# Patient Record
Sex: FEMALE | Race: BLACK OR AFRICAN AMERICAN | NOT HISPANIC OR LATINO | URBAN - METROPOLITAN AREA
[De-identification: names, ages, dates, MRNs, and addresses within clinical notes are randomized per-mention and may not be internally consistent; named-entity substitution may affect disease eponyms.]

---

## 2021-02-21 ENCOUNTER — EMERGENCY (EMERGENCY)
Facility: HOSPITAL | Age: 26
LOS: 1 days | Discharge: PSYCHIATRIC OR SUBSTANCE USE FACILITY | End: 2021-02-23
Attending: EMERGENCY MEDICINE
Payer: COMMERCIAL

## 2021-02-21 VITALS
OXYGEN SATURATION: 100 % | HEART RATE: 63 BPM | DIASTOLIC BLOOD PRESSURE: 87 MMHG | RESPIRATION RATE: 18 BRPM | SYSTOLIC BLOOD PRESSURE: 125 MMHG | TEMPERATURE: 98 F | WEIGHT: 125 LBS | HEIGHT: 68 IN

## 2021-02-21 DIAGNOSIS — Z20.822 CONTACT WITH AND (SUSPECTED) EXPOSURE TO COVID-19: ICD-10-CM

## 2021-02-21 DIAGNOSIS — R45.851 SUICIDAL IDEATIONS: ICD-10-CM

## 2021-02-21 DIAGNOSIS — F29 UNSPECIFIED PSYCHOSIS NOT DUE TO A SUBSTANCE OR KNOWN PHYSIOLOGICAL CONDITION: ICD-10-CM

## 2021-02-21 LAB
ALBUMIN SERPL ELPH-MCNC: 4.4 G/DL — SIGNIFICANT CHANGE UP (ref 3.3–5)
ALP SERPL-CCNC: 44 U/L — SIGNIFICANT CHANGE UP (ref 40–120)
ALT FLD-CCNC: 24 U/L — SIGNIFICANT CHANGE UP (ref 12–78)
ANION GAP SERPL CALC-SCNC: 11 MMOL/L — SIGNIFICANT CHANGE UP (ref 5–17)
APAP SERPL-MCNC: < 2 UG/ML (ref 10–30)
AST SERPL-CCNC: 22 U/L — SIGNIFICANT CHANGE UP (ref 15–37)
BASOPHILS # BLD AUTO: 0.04 K/UL — SIGNIFICANT CHANGE UP (ref 0–0.2)
BASOPHILS NFR BLD AUTO: 0.4 % — SIGNIFICANT CHANGE UP (ref 0–2)
BILIRUB SERPL-MCNC: 0.6 MG/DL — SIGNIFICANT CHANGE UP (ref 0.2–1.2)
BUN SERPL-MCNC: 9 MG/DL — SIGNIFICANT CHANGE UP (ref 7–23)
CALCIUM SERPL-MCNC: 9 MG/DL — SIGNIFICANT CHANGE UP (ref 8.5–10.1)
CHLORIDE SERPL-SCNC: 104 MMOL/L — SIGNIFICANT CHANGE UP (ref 96–108)
CO2 SERPL-SCNC: 23 MMOL/L — SIGNIFICANT CHANGE UP (ref 22–31)
CREAT SERPL-MCNC: 0.82 MG/DL — SIGNIFICANT CHANGE UP (ref 0.5–1.3)
EOSINOPHIL # BLD AUTO: 0.02 K/UL — SIGNIFICANT CHANGE UP (ref 0–0.5)
EOSINOPHIL NFR BLD AUTO: 0.2 % — SIGNIFICANT CHANGE UP (ref 0–6)
ETHANOL SERPL-MCNC: <10 MG/DL — SIGNIFICANT CHANGE UP (ref 0–10)
GLUCOSE SERPL-MCNC: 73 MG/DL — SIGNIFICANT CHANGE UP (ref 70–99)
HCT VFR BLD CALC: 37.7 % — SIGNIFICANT CHANGE UP (ref 34.5–45)
HGB BLD-MCNC: 12.5 G/DL — SIGNIFICANT CHANGE UP (ref 11.5–15.5)
IMM GRANULOCYTES NFR BLD AUTO: 0.4 % — SIGNIFICANT CHANGE UP (ref 0–1.5)
LYMPHOCYTES # BLD AUTO: 2.36 K/UL — SIGNIFICANT CHANGE UP (ref 1–3.3)
LYMPHOCYTES # BLD AUTO: 23.8 % — SIGNIFICANT CHANGE UP (ref 13–44)
MCHC RBC-ENTMCNC: 26.9 PG — LOW (ref 27–34)
MCHC RBC-ENTMCNC: 33.2 GM/DL — SIGNIFICANT CHANGE UP (ref 32–36)
MCV RBC AUTO: 81.1 FL — SIGNIFICANT CHANGE UP (ref 80–100)
MONOCYTES # BLD AUTO: 0.8 K/UL — SIGNIFICANT CHANGE UP (ref 0–0.9)
MONOCYTES NFR BLD AUTO: 8.1 % — SIGNIFICANT CHANGE UP (ref 2–14)
NEUTROPHILS # BLD AUTO: 6.66 K/UL — SIGNIFICANT CHANGE UP (ref 1.8–7.4)
NEUTROPHILS NFR BLD AUTO: 67.1 % — SIGNIFICANT CHANGE UP (ref 43–77)
NRBC # BLD: 0 /100 WBCS — SIGNIFICANT CHANGE UP (ref 0–0)
PLATELET # BLD AUTO: 295 K/UL — SIGNIFICANT CHANGE UP (ref 150–400)
POTASSIUM SERPL-MCNC: 4.1 MMOL/L — SIGNIFICANT CHANGE UP (ref 3.5–5.3)
POTASSIUM SERPL-SCNC: 4.1 MMOL/L — SIGNIFICANT CHANGE UP (ref 3.5–5.3)
PROT SERPL-MCNC: 8 GM/DL — SIGNIFICANT CHANGE UP (ref 6–8.3)
RBC # BLD: 4.65 M/UL — SIGNIFICANT CHANGE UP (ref 3.8–5.2)
RBC # FLD: 13.7 % — SIGNIFICANT CHANGE UP (ref 10.3–14.5)
SALICYLATES SERPL-MCNC: <1.7 MG/DL — LOW (ref 2.8–20)
SODIUM SERPL-SCNC: 138 MMOL/L — SIGNIFICANT CHANGE UP (ref 135–145)
TSH SERPL-MCNC: 0.65 UU/ML — SIGNIFICANT CHANGE UP (ref 0.36–3.74)
WBC # BLD: 9.92 K/UL — SIGNIFICANT CHANGE UP (ref 3.8–10.5)
WBC # FLD AUTO: 9.92 K/UL — SIGNIFICANT CHANGE UP (ref 3.8–10.5)

## 2021-02-21 PROCEDURE — 93010 ELECTROCARDIOGRAM REPORT: CPT

## 2021-02-21 PROCEDURE — 90792 PSYCH DIAG EVAL W/MED SRVCS: CPT | Mod: 95

## 2021-02-21 PROCEDURE — 99285 EMERGENCY DEPT VISIT HI MDM: CPT
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
## 2021-02-21 NOTE — ED PROVIDER NOTE - FAMILY DETAILS FREE TEXT FOR MDM ADDL HISTORY OBTAINED FROM QUESTION
Mother and Father in waiting room.  States patient has been isolating herself much more and withdrawn.  The three of them just came from vacation in [place].  During the entire trip she continued to withdrawn and not wanting to get outside.  She admitted to her parents that she has thought of killing herself and expressed her many thoughts of wanting to kill them.  She tells her mother that she has had frequent thoughts of her dying because of her.  Mother states that she no longer feels safe.  While on the plane the patient seemed to wander towards the [facility] area searching for a way to get off the plane.  While on the airtran she kept trying to get away.  And she tried to jump in the  seat to drive off.  Family kept asking her where she was trying to get to but patient does not respond.  Family states that her eyes seem wide and she "seems off."

## 2021-02-21 NOTE — ED PROVIDER NOTE - CLINICAL SUMMARY MEDICAL DECISION MAKING FREE TEXT BOX
Patient with depression and suicidal and homicidal thoughts withdrawn behavior and flat affect.  Will check labs including TSH and have patient evaluated by psychiatry.  Patient signed out to overnight physician pending psychiatric evaluation.
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
## 2021-02-21 NOTE — ED PROVIDER NOTE - OBJECTIVE STATEMENT
This patient is a 25 year old woman who presents to the ER for psychiatric evaluation.  Patient reporting thoughts of self harm with plan to stab herself.  She denies hallucinations, alcohol and drug use.  Patient states that she has a lot of stress.

## 2021-02-21 NOTE — ED PROVIDER NOTE - SKIN, MLM
Skin normal color for race, warm, dry and intact. No evidence of rash.  Tan lines noted no abrasion or cuts.

## 2021-02-21 NOTE — ED PROVIDER NOTE - PROGRESS NOTE DETAILS
Mother - Esther Mendoza [phone]  Father - Yao Mendoza [phone] TelePsych called and made aware. Telepsych (Dr. Cortes) recommends patient be given Seroquel 25mg for sleep and she will be reassessed in the morning.

## 2021-02-21 NOTE — ED ADULT TRIAGE NOTE - CHIEF COMPLAINT QUOTE
pt endorses SI, with no plan. states she attempted to hurt herself with a pen earlier today. hx cutting her back. denies hallucinations. pt states "I get angry sometimes, but I don't want to hurt anyone." first time pt is speaking regarding mental health concerns. pt is withdrawn in triage. pt endorses SI, with no plan. states she attempted to hurt herself with a pen earlier today. hx cutting her back. denies hallucinations. pt states "I get angry sometimes, but I don't want to hurt anyone." first time pt is speaking regarding mental health concerns. pt is withdrawn in triage. 1:1 constant observation initiated in triage

## 2021-02-21 NOTE — ED ADULT NURSE NOTE - ED STAT RN HANDOFF DETAILS
Report endorsed to oncoming RN Yue. Safety checks completed this shift. Safety rounds completed hourly.  IV sites checked Q2+remains WDL. Medications administered as ordered with no signs/symptoms of adverse reactions. Fall & skin precautions in place. Any issues endorsed to oncoming RN for follow up. 1:1 mantained for SI.

## 2021-02-22 DIAGNOSIS — F32.9 MAJOR DEPRESSIVE DISORDER, SINGLE EPISODE, UNSPECIFIED: ICD-10-CM

## 2021-02-22 LAB
AMPHET UR-MCNC: NEGATIVE — SIGNIFICANT CHANGE UP
APPEARANCE UR: CLEAR — SIGNIFICANT CHANGE UP
BARBITURATES UR SCN-MCNC: NEGATIVE — SIGNIFICANT CHANGE UP
BENZODIAZ UR-MCNC: NEGATIVE — SIGNIFICANT CHANGE UP
BILIRUB UR-MCNC: NEGATIVE — SIGNIFICANT CHANGE UP
COCAINE METAB.OTHER UR-MCNC: NEGATIVE — SIGNIFICANT CHANGE UP
COLOR SPEC: YELLOW — SIGNIFICANT CHANGE UP
DIFF PNL FLD: NEGATIVE — SIGNIFICANT CHANGE UP
FLUAV AG NPH QL: SIGNIFICANT CHANGE UP
FLUBV AG NPH QL: SIGNIFICANT CHANGE UP
GLUCOSE UR QL: NEGATIVE MG/DL — SIGNIFICANT CHANGE UP
HCG UR QL: NEGATIVE — SIGNIFICANT CHANGE UP
KETONES UR-MCNC: ABNORMAL
LEUKOCYTE ESTERASE UR-ACNC: NEGATIVE — SIGNIFICANT CHANGE UP
METHADONE UR-MCNC: NEGATIVE — SIGNIFICANT CHANGE UP
NITRITE UR-MCNC: NEGATIVE — SIGNIFICANT CHANGE UP
OPIATES UR-MCNC: NEGATIVE — SIGNIFICANT CHANGE UP
PCP SPEC-MCNC: SIGNIFICANT CHANGE UP
PCP UR-MCNC: NEGATIVE — SIGNIFICANT CHANGE UP
PH UR: 6 — SIGNIFICANT CHANGE UP (ref 5–8)
PROT UR-MCNC: NEGATIVE MG/DL — SIGNIFICANT CHANGE UP
SARS-COV-2 RNA SPEC QL NAA+PROBE: SIGNIFICANT CHANGE UP
SP GR SPEC: 1.01 — SIGNIFICANT CHANGE UP (ref 1.01–1.02)
THC UR QL: NEGATIVE — SIGNIFICANT CHANGE UP
UROBILINOGEN FLD QL: NEGATIVE MG/DL — SIGNIFICANT CHANGE UP

## 2021-02-22 PROCEDURE — 70450 CT HEAD/BRAIN W/O DYE: CPT | Mod: 26,MA

## 2021-02-22 PROCEDURE — 90792 PSYCH DIAG EVAL W/MED SRVCS: CPT | Mod: 95

## 2021-02-22 RX ORDER — QUETIAPINE FUMARATE 200 MG/1
25 TABLET, FILM COATED ORAL ONCE
Refills: 0 | Status: COMPLETED | OUTPATIENT
Start: 2021-02-22 | End: 2021-02-22

## 2021-02-22 RX ORDER — RISPERIDONE 4 MG/1
0.5 TABLET ORAL EVERY 12 HOURS
Refills: 0 | Status: DISCONTINUED | OUTPATIENT
Start: 2021-02-22 | End: 2021-02-23

## 2021-02-22 NOTE — ED BEHAVIORAL HEALTH ASSESSMENT NOTE - DIFFERENTIAL
Impression:  include first break psychosis, MDD with psychotic features, substance induced psychotic disorder

## 2021-02-22 NOTE — ED ADULT NURSE REASSESSMENT NOTE - NS ED NURSE REASSESS COMMENT FT1
Patient alert and oriented, attempted to redraw green top, unsuccessful. MD Alfonso made aware. Phlebotomist  paged.
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
## 2021-02-22 NOTE — ED BEHAVIORAL HEALTH ASSESSMENT NOTE - HPI (INCLUDE ILLNESS QUALITY, SEVERITY, DURATION, TIMING, CONTEXT, MODIFYING FACTORS, ASSOCIATED SIGNS AND SYMPTOMS)
This is a 25 yo AA female, domiciled in [place], single, unemployed, no children, no prior psych history, no med history, who was BIB by parents for disorganized behavior, SI and HI.    Patient uncooperative, presents with thought blocking and refusing to speak.  States she “has a confession” and tells writer she is “sad”.  She however denies SI and HI with no plan or intent.  She reports she has not slept for “a while”.  She then refuses to engage in evaluation.

## 2021-02-22 NOTE — ED BEHAVIORAL HEALTH ASSESSMENT NOTE - OTHER PAST PSYCHIATRIC HISTORY (INCLUDE DETAILS REGARDING ONSET, COURSE OF ILLNESS, INPATIENT/OUTPATIENT TREATMENT)
PPH:    -Diagnosis:  denies   -Psych hospitalizations:  denies   -SA/HA:  denies   -Current meds: none   -Medication trials:  none   -Outpatient psychiatric care:   does not have one

## 2021-02-22 NOTE — ED BEHAVIORAL HEALTH NOTE - BEHAVIORAL HEALTH NOTE
26yo domiciled w/ family, unemployed, single AA F w/ no known past hx, w/ hx of cannabis use who presents w/ family for disorganized beh, with reported SI/HI expressed. Pt had been awaiting re-assessment pending utox and ct head. Both are neg.       Pt was seen and evaluated via telemonitor. Pt remained oddly related, internally preoccupied, thought blocked. Patient started to say that she "felt happier" and was "less stressed." When asked about why this was, pt started to fold her hands in prayer. She reported that she "needed to confess for all the bad things she's done."    Collateral from ED attending: reported that pt was offered po risperdal in am but refused; remains medically clear; reported no s/sx of encephalopathy, no acute medical issues.     Collateral from parents: Yao [phone] and Esther [phone]: updated about findings of CT head and plan for inpatient psych admission pending bed availability; asked to inform when pt is assisgned a bed      CT head: neg; Utox: neg; COVID: neg    MSE: appears stated age, oddly related. +speech latency. +thought blocking, +internally preoccupied; +delusions of guilt, some Judaism preoccupation. Insight and judgment: poor/poor. Impulse control: fair        Diagnosis: unspecified psychosis      Assessment and plan:  Pt w/ continued speech latency, with thought blocking, internal preoccupation with disorganized beh. She's now noted to be randomly praying and trying to confess. Pt's CT head and utox are neg. Pt will need psychiatric hospitalization as she is unable to care for herself and gravely disabled by her ongoing psychosis.    -start risperdal 0.5mg po BID  -continue 1:1 inc elopement precaution  -prn: haldol 2mg po/Im q6hr prn agitation  -telepsych to continue to follow  -psych dispo: 2PC involuntary psych admission pending bed availability  -ED provider made aware
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
## 2021-02-22 NOTE — ED BEHAVIORAL HEALTH ASSESSMENT NOTE - PSYCHIATRIC ISSUES AND PLAN (INCLUDE STANDING AND PRN MEDICATION)
Seroquel 25mg PO once for insomnia;  Haldol 5mg PO q6 hours prn for agitation, Ativan 2mg PO q6 hours prn for severe anxiety

## 2021-02-22 NOTE — ED BEHAVIORAL HEALTH ASSESSMENT NOTE - RISK ASSESSMENT
Risk factors include history of depression, family history of schizophrenia, poor adl’s, withdrawn, history of cannabis use.  Protective factors include supportive family, no acute medical problems, no prior history of suicidal attempts, no access to weapons Unable to determine Suicide Risk

## 2021-02-22 NOTE — ED BEHAVIORAL HEALTH ASSESSMENT NOTE - SUMMARY
This is a 25 yo AA female, domiciled in [place], single, unemployed, no children, no prior psych history, no med history, who was BIB by parents for disorganized behavior, SI and HI.    Patient is uncooperative.  She reports feeling sad but denies SI and HI with no plan or intent.  She states she has not slept for a while.  Parents however report she has thought of killing herself and expressed many thoughts of wanting to kill them. Parents reports she is disorganized, odd, poor ADL’s, not eating and she has history of cannabis use. There is no psych history.  Differentials include first break psychosis, MDD with psychotic features, substance induced psychotic disorder.   Will need to reassess patient after she has slept, utox is obtained and she is more cooperative.     COVID SCREENIN.  Have you had a COVID-19 test in the last 21 days?  Pt. would not answer  2.  In the past 10 days, have you been around anyone with a positive COVID-19 test?  Pt. would not answer  3.Have you been out of [place] within the past 10 days?  Pt. would not answer, but parents state they just went to [place]     Plan:  1.  Hold for reassess  2.  Medication: Seroquel 25mg PO once for insomnia;  Haldol 5mg PO q6 hours prn for agitation, Ativan 2mg PO q6 hours prn for severe anxiety  3.  Observation 1:1 close observation   4.  Follow up labs, EKG, urine tox

## 2021-02-22 NOTE — ED ADULT NURSE REASSESSMENT NOTE - NS ED NURSE REASSESS COMMENT FT1
PT ALERT AND AT BEDSIDE, REFUSING TO SIT DOWN, STANDING AT THIS DOOR SAT THIS TIME, PT WAS APPROACHED MEDS REFUSED, DESPITE EDUCATION AND ENCOURAGEMENT, MD ,MADE AWARE , WILL CONTINUE TO MONITOR.

## 2021-02-23 VITALS
OXYGEN SATURATION: 100 % | HEART RATE: 103 BPM | TEMPERATURE: 99 F | RESPIRATION RATE: 18 BRPM | SYSTOLIC BLOOD PRESSURE: 132 MMHG | DIASTOLIC BLOOD PRESSURE: 91 MMHG

## 2021-06-21 ENCOUNTER — EMERGENCY (EMERGENCY)
Facility: HOSPITAL | Age: 26
LOS: 0 days | Discharge: ANOTHER HOSPITAL | End: 2021-06-22
Attending: STUDENT IN AN ORGANIZED HEALTH CARE EDUCATION/TRAINING PROGRAM
Payer: MEDICAID

## 2021-06-21 VITALS
SYSTOLIC BLOOD PRESSURE: 110 MMHG | RESPIRATION RATE: 16 BRPM | DIASTOLIC BLOOD PRESSURE: 77 MMHG | TEMPERATURE: 98 F | OXYGEN SATURATION: 99 % | WEIGHT: 119.93 LBS | HEART RATE: 70 BPM | HEIGHT: 68 IN

## 2021-06-21 DIAGNOSIS — F20.9 SCHIZOPHRENIA, UNSPECIFIED: ICD-10-CM

## 2021-06-21 DIAGNOSIS — Z91.14 PATIENT'S OTHER NONCOMPLIANCE WITH MEDICATION REGIMEN: ICD-10-CM

## 2021-06-21 DIAGNOSIS — Z91.5 PERSONAL HISTORY OF SELF-HARM: ICD-10-CM

## 2021-06-21 DIAGNOSIS — Z20.822 CONTACT WITH AND (SUSPECTED) EXPOSURE TO COVID-19: ICD-10-CM

## 2021-06-21 DIAGNOSIS — R45.851 SUICIDAL IDEATIONS: ICD-10-CM

## 2021-06-21 LAB
ALBUMIN SERPL ELPH-MCNC: 3.9 G/DL — SIGNIFICANT CHANGE UP (ref 3.3–5)
ALP SERPL-CCNC: 45 U/L — SIGNIFICANT CHANGE UP (ref 40–120)
ALT FLD-CCNC: 21 U/L — SIGNIFICANT CHANGE UP (ref 12–78)
AMPHET UR-MCNC: NEGATIVE — SIGNIFICANT CHANGE UP
ANION GAP SERPL CALC-SCNC: 6 MMOL/L — SIGNIFICANT CHANGE UP (ref 5–17)
APAP SERPL-MCNC: < 2 UG/ML (ref 10–30)
APPEARANCE UR: ABNORMAL
AST SERPL-CCNC: 12 U/L — LOW (ref 15–37)
BACTERIA # UR AUTO: ABNORMAL
BARBITURATES UR SCN-MCNC: NEGATIVE — SIGNIFICANT CHANGE UP
BASOPHILS # BLD AUTO: 0.05 K/UL — SIGNIFICANT CHANGE UP (ref 0–0.2)
BASOPHILS NFR BLD AUTO: 0.7 % — SIGNIFICANT CHANGE UP (ref 0–2)
BENZODIAZ UR-MCNC: NEGATIVE — SIGNIFICANT CHANGE UP
BILIRUB SERPL-MCNC: 0.4 MG/DL — SIGNIFICANT CHANGE UP (ref 0.2–1.2)
BILIRUB UR-MCNC: NEGATIVE — SIGNIFICANT CHANGE UP
BUN SERPL-MCNC: 14 MG/DL — SIGNIFICANT CHANGE UP (ref 7–23)
CALCIUM SERPL-MCNC: 9.1 MG/DL — SIGNIFICANT CHANGE UP (ref 8.5–10.1)
CHLORIDE SERPL-SCNC: 103 MMOL/L — SIGNIFICANT CHANGE UP (ref 96–108)
CO2 SERPL-SCNC: 30 MMOL/L — SIGNIFICANT CHANGE UP (ref 22–31)
COCAINE METAB.OTHER UR-MCNC: NEGATIVE — SIGNIFICANT CHANGE UP
COLOR SPEC: YELLOW — SIGNIFICANT CHANGE UP
CREAT SERPL-MCNC: 0.97 MG/DL — SIGNIFICANT CHANGE UP (ref 0.5–1.3)
DIFF PNL FLD: ABNORMAL
EOSINOPHIL # BLD AUTO: 0.12 K/UL — SIGNIFICANT CHANGE UP (ref 0–0.5)
EOSINOPHIL NFR BLD AUTO: 1.7 % — SIGNIFICANT CHANGE UP (ref 0–6)
EPI CELLS # UR: SIGNIFICANT CHANGE UP
ETHANOL SERPL-MCNC: <10 MG/DL — SIGNIFICANT CHANGE UP (ref 0–10)
FLUAV AG NPH QL: SIGNIFICANT CHANGE UP
FLUBV AG NPH QL: SIGNIFICANT CHANGE UP
GLUCOSE SERPL-MCNC: 78 MG/DL — SIGNIFICANT CHANGE UP (ref 70–99)
GLUCOSE UR QL: NEGATIVE MG/DL — SIGNIFICANT CHANGE UP
HCG UR QL: NEGATIVE — SIGNIFICANT CHANGE UP
HCT VFR BLD CALC: 39.9 % — SIGNIFICANT CHANGE UP (ref 34.5–45)
HGB BLD-MCNC: 12.8 G/DL — SIGNIFICANT CHANGE UP (ref 11.5–15.5)
IMM GRANULOCYTES NFR BLD AUTO: 0.1 % — SIGNIFICANT CHANGE UP (ref 0–1.5)
KETONES UR-MCNC: NEGATIVE — SIGNIFICANT CHANGE UP
LEUKOCYTE ESTERASE UR-ACNC: ABNORMAL
LYMPHOCYTES # BLD AUTO: 3.23 K/UL — SIGNIFICANT CHANGE UP (ref 1–3.3)
LYMPHOCYTES # BLD AUTO: 46 % — HIGH (ref 13–44)
MCHC RBC-ENTMCNC: 27.4 PG — SIGNIFICANT CHANGE UP (ref 27–34)
MCHC RBC-ENTMCNC: 32.1 GM/DL — SIGNIFICANT CHANGE UP (ref 32–36)
MCV RBC AUTO: 85.3 FL — SIGNIFICANT CHANGE UP (ref 80–100)
METHADONE UR-MCNC: NEGATIVE — SIGNIFICANT CHANGE UP
MONOCYTES # BLD AUTO: 0.71 K/UL — SIGNIFICANT CHANGE UP (ref 0–0.9)
MONOCYTES NFR BLD AUTO: 10.1 % — SIGNIFICANT CHANGE UP (ref 2–14)
NEUTROPHILS # BLD AUTO: 2.9 K/UL — SIGNIFICANT CHANGE UP (ref 1.8–7.4)
NEUTROPHILS NFR BLD AUTO: 41.4 % — LOW (ref 43–77)
NITRITE UR-MCNC: NEGATIVE — SIGNIFICANT CHANGE UP
NRBC # BLD: 0 /100 WBCS — SIGNIFICANT CHANGE UP (ref 0–0)
OPIATES UR-MCNC: NEGATIVE — SIGNIFICANT CHANGE UP
PCP SPEC-MCNC: SIGNIFICANT CHANGE UP
PCP UR-MCNC: NEGATIVE — SIGNIFICANT CHANGE UP
PH UR: 6.5 — SIGNIFICANT CHANGE UP (ref 5–8)
PLATELET # BLD AUTO: 257 K/UL — SIGNIFICANT CHANGE UP (ref 150–400)
POTASSIUM SERPL-MCNC: 3.3 MMOL/L — LOW (ref 3.5–5.3)
POTASSIUM SERPL-SCNC: 3.3 MMOL/L — LOW (ref 3.5–5.3)
PROT SERPL-MCNC: 7.6 GM/DL — SIGNIFICANT CHANGE UP (ref 6–8.3)
PROT UR-MCNC: 15 MG/DL
RBC # BLD: 4.68 M/UL — SIGNIFICANT CHANGE UP (ref 3.8–5.2)
RBC # FLD: 13.7 % — SIGNIFICANT CHANGE UP (ref 10.3–14.5)
RBC CASTS # UR COMP ASSIST: SIGNIFICANT CHANGE UP /HPF (ref 0–4)
SALICYLATES SERPL-MCNC: <1.7 MG/DL — LOW (ref 2.8–20)
SARS-COV-2 RNA SPEC QL NAA+PROBE: SIGNIFICANT CHANGE UP
SODIUM SERPL-SCNC: 139 MMOL/L — SIGNIFICANT CHANGE UP (ref 135–145)
SP GR SPEC: 1.01 — SIGNIFICANT CHANGE UP (ref 1.01–1.02)
THC UR QL: NEGATIVE — SIGNIFICANT CHANGE UP
TSH SERPL-MCNC: 2.3 UIU/ML — SIGNIFICANT CHANGE UP (ref 0.36–3.74)
UROBILINOGEN FLD QL: NEGATIVE MG/DL — SIGNIFICANT CHANGE UP
WBC # BLD: 7.02 K/UL — SIGNIFICANT CHANGE UP (ref 3.8–10.5)
WBC # FLD AUTO: 7.02 K/UL — SIGNIFICANT CHANGE UP (ref 3.8–10.5)
WBC UR QL: ABNORMAL

## 2021-06-21 PROCEDURE — 93010 ELECTROCARDIOGRAM REPORT: CPT

## 2021-06-21 PROCEDURE — ZZZZZ: CPT

## 2021-06-21 PROCEDURE — 99284 EMERGENCY DEPT VISIT MOD MDM: CPT

## 2021-06-21 NOTE — ED BEHAVIORAL HEALTH NOTE - BEHAVIORAL HEALTH NOTE
Consult requested by Dr. Quinn at 22:42. Telepsychiatry attempted consult at 23:20 but unable to initiate due to delay in setting up patient on telepsychiatry device. ED staff educated to notify Telepsychiatry once patient is ready on telepsychiatry device.

## 2021-06-21 NOTE — ED BEHAVIORAL HEALTH NOTE - BEHAVIORAL HEALTH NOTE
===================  PRE-HOSPITAL COURSE  ===================  SOURCE:  RN/ED documentation     DETAILS:  Pt. BIB mother c/o SI w. plan to "jump off the train track", w. CAH to hurt herself.       ============  ED COURSE   ============  SOURCE:  RN/ED documentation     ARRIVAL:  BIB mother    BELONGINGS:  no belongings of note    BEHAVIOR: Pt. arrived to ED alert and oriented, appearing in good hygiene.  Pt. calm and cooperative in ED, complied willingly with triage processes.  Pt. maintains good eye contact, speech is clear and coherent, thoughts seemingly logical and linear in nature. Pt. endorses SI with plan to "jump off the train track".  She also reports she tried to cut herself yesterday and there are 3 linear, superficial abrasions on her left wrist.  Pt. also endorses AH of voices that are command in nature, telling her to hurt herself.  She expresses she is compliant with her medication.  She denies any recent substance use.  In ED pt. mood reportedly euthymic with congruent full range affect. She has been resting comfortably while awaiting evaluation.     TREATMENT:  no medications given, no security intervention required     VISITORS:  mother present at bedside for visit until ~11pm       ========================  COLLATERAL  ========================  NAME: Esther Mendoza.     NUMBER: [phone].     RELATIONSHIP:  Mother.     RELIABILITY: Reliable per last assessment    COMMENTS: left voicemail requesting callback            COVID Exposure Screen- ED  1.	*Has the patient had a COVID-19 test in the last 90 days?  (  ) Yes   ( X ) No   (  ) Unknown-   IF YES PROCEED TO QUESTION #2. IF NO OR UNKNOWN, PLEASE SKIP TO QUESTION #3.  2.	Date of test(s) and result(s):   3.	*Has the patient tested positive for COVID-19 antibodies? (  ) Yes   (  X) No   (  ) Unknown-   IF YES PROCEED TO QUESTION #4. IF NO or UNKNOWN, PLEASE SKIP TO QUESTION #5.  4.	Date of positive antibody test: ________  5.	*Has the patient received 2 doses of the COVID-19 vaccine? (  ) Yes   (X  ) No   (  ) Unknown-   IF YES PROCEED TO QUESTION #6. IF NO or UNKNOWN, PLEASE SKIP TO QUESTION #7.  6.	 Date of second dose: _______  7.	*In the past 10 days, has the patient been around anyone with a positive COVID-19 test?* (  ) Yes   ( X  ) No   (  ) Unknown-   IF YES PROCEED TO QUESTION #8. IF NO or UNKNOWN, PLEASE SKIP TO QUESTION #13.  8.	Was the patient within 6 feet of them for at least 15 minutes? (  ) Yes   (  ) No   (  ) Unknown- Reason: _____  9.	Did the patient provide care for them? (  ) Yes   (  ) No   (  ) Unknown- Reason: ______  10.	Did the patient have direct physical contact with them (touched, hugged, or kissed them)? (  ) Yes   (  ) No    (  ) Unknown- Reason: __  11.	Did the patient share eating or drinking utensils with them? (  ) Yes   (  ) No    (  ) Unknown- Reason: ____  12.	Did they sneeze, cough, or somehow get respiratory droplets on the patient? (  ) Yes   (  ) No    (  ) Unknown- Reason: ______  13.	*Has the patient been out of [place] within the past 10 days?* (  ) Yes   (  X) No   (  ) Unknown-   IF YES PLEASE ANSWER THE FOLLOWING QUESTIONS:  14.	Which state/country did they go to? ______  15.	Were they there over 24 hours? (  ) Yes   (  ) No    (  ) Unknown- Reason: ______  16.	Date of return to [facility]: ______.

## 2021-06-21 NOTE — ED PROVIDER NOTE - CLINICAL SUMMARY MEDICAL DECISION MAKING FREE TEXT BOX
26F presenting for suicidality and hallucinations despite reported compliance with her psychiatric medications. pt calm/cooperative in ED. Put on 1:1, psych order set placed. Will consult telepsych once results back.

## 2021-06-21 NOTE — ED ADULT TRIAGE NOTE - CHIEF COMPLAINT QUOTE
pt states "I'm having suicidal thoughts and I'm hearing voices." c/o command auditory hallucinations, visual hallucinations. pt states she wants to "jump off the train track" to hurt herself. denies: HI at this time. 1:1 constant observation initiated in triage

## 2021-06-21 NOTE — ED ADULT NURSE NOTE - NSIMPLEMENTINTERV_GEN_ALL_ED
Implemented All Universal Safety Interventions:  [place] to call system. Call bell, personal items and telephone within reach. Instruct patient to call for assistance. Room bathroom lighting operational. Non-slip footwear when patient is off stretcher. Physically safe environment: no spills, clutter or unnecessary equipment. Stretcher in lowest position, wheels locked, appropriate side rails in place.

## 2021-06-21 NOTE — ED ADULT NURSE NOTE - OBJECTIVE STATEMENT
26F aaox4 ambulatory with no recent travel or sick contacts, came to ED accompanied by family to be evaluated by psychiatrist, reports she tried cutting her left wrist yesterday, noted 3 suiperficial linear abrasion in the left wrist. Reports works at a [employer] 3x a week, last work thursday. No fever or chills, denies any pain.. Placed on one to one observation, cooperative with labs and UA. Pending TP.

## 2021-06-21 NOTE — ED PROVIDER NOTE - OBJECTIVE STATEMENT
26F hx psych admission on 4mg risperdal daily presenting for auditory hallucinations telling her to harm herself, per pt have been going on several days despite reported compliance with her risperdal. States that she tried to cut herself yesterday and points to superficial lacerations on her L wrist. Denies homicidal ideation. Denies medical complaints, pain, use of illicit drugs or alcohol. 26F hx psych admission on 4mg risperdal daily presenting for auditory hallucinations telling her to harm herself, per pt have been going on several days despite reported compliance with her risperdal. States that she tried to cut herself yesterday and points to superficial lacerations on her L wrist. Denies homicidal ideation. Denies medical complaints, pain, use of illicit drugs or alcohol.    pt was admitted to [facility] in early 2021. Since dc has had intermittent passive SI, some paranois, per therapist mostly owing to medication noncompliance. Per therapist, in last week pt was having active ideation ab jumping in front of a train. Also states pt has had very frquent command hallucinations to hurt herself. Therapist skeptical ab medication compliance.     Therapist: Vickie Grijalva [phone]

## 2021-06-22 ENCOUNTER — INPATIENT (INPATIENT)
Facility: HOSPITAL | Age: 26
LOS: 9 days | Discharge: HOME | End: 2021-07-02
Attending: PSYCHIATRY & NEUROLOGY | Admitting: PSYCHIATRY & NEUROLOGY
Payer: MEDICAID

## 2021-06-22 VITALS
TEMPERATURE: 97 F | SYSTOLIC BLOOD PRESSURE: 106 MMHG | DIASTOLIC BLOOD PRESSURE: 67 MMHG | HEART RATE: 80 BPM | RESPIRATION RATE: 16 BRPM

## 2021-06-22 VITALS
TEMPERATURE: 98 F | RESPIRATION RATE: 16 BRPM | DIASTOLIC BLOOD PRESSURE: 93 MMHG | SYSTOLIC BLOOD PRESSURE: 105 MMHG | OXYGEN SATURATION: 99 % | HEART RATE: 90 BPM

## 2021-06-22 DIAGNOSIS — F20.9 SCHIZOPHRENIA, UNSPECIFIED: ICD-10-CM

## 2021-06-22 DIAGNOSIS — E87.6 HYPOKALEMIA: ICD-10-CM

## 2021-06-22 DIAGNOSIS — Z91.5 PERSONAL HISTORY OF SELF-HARM: ICD-10-CM

## 2021-06-22 PROBLEM — Z78.9 OTHER SPECIFIED HEALTH STATUS: Chronic | Status: ACTIVE | Noted: 2021-02-21

## 2021-06-22 LAB
COVID-19 SPIKE DOMAIN AB INTERP: POSITIVE
COVID-19 SPIKE DOMAIN ANTIBODY RESULT: 196 U/ML — HIGH
SARS-COV-2 IGG+IGM SERPL QL IA: 196 U/ML — HIGH
SARS-COV-2 IGG+IGM SERPL QL IA: POSITIVE

## 2021-06-22 PROCEDURE — 99232 SBSQ HOSP IP/OBS MODERATE 35: CPT

## 2021-06-22 RX ORDER — DIPHENHYDRAMINE HCL 50 MG
50 CAPSULE ORAL EVERY 6 HOURS
Refills: 0 | Status: DISCONTINUED | OUTPATIENT
Start: 2021-06-22 | End: 2021-06-22

## 2021-06-22 RX ORDER — HALOPERIDOL DECANOATE 100 MG/ML
5 INJECTION INTRAMUSCULAR EVERY 6 HOURS
Refills: 0 | Status: DISCONTINUED | OUTPATIENT
Start: 2021-06-22 | End: 2021-07-02

## 2021-06-22 RX ORDER — OLANZAPINE 15 MG/1
5 TABLET, FILM COATED ORAL ONCE
Refills: 0 | Status: COMPLETED | OUTPATIENT
Start: 2021-06-22 | End: 2021-06-22

## 2021-06-22 RX ORDER — ARIPIPRAZOLE 15 MG/1
2 TABLET ORAL DAILY
Refills: 0 | Status: DISCONTINUED | OUTPATIENT
Start: 2021-06-23 | End: 2021-06-28

## 2021-06-22 RX ORDER — HALOPERIDOL DECANOATE 100 MG/ML
5 INJECTION INTRAMUSCULAR EVERY 6 HOURS
Refills: 0 | Status: DISCONTINUED | OUTPATIENT
Start: 2021-06-22 | End: 2021-06-22

## 2021-06-22 RX ORDER — POTASSIUM CHLORIDE 20 MEQ
20 PACKET (EA) ORAL ONCE
Refills: 0 | Status: COMPLETED | OUTPATIENT
Start: 2021-06-22 | End: 2021-06-22

## 2021-06-22 RX ORDER — POTASSIUM CHLORIDE 20 MEQ
40 PACKET (EA) ORAL EVERY 4 HOURS
Refills: 0 | Status: COMPLETED | OUTPATIENT
Start: 2021-06-22 | End: 2021-06-22

## 2021-06-22 RX ADMIN — Medication 40 MILLIEQUIVALENT(S): at 20:22

## 2021-06-22 RX ADMIN — Medication 20 MILLIEQUIVALENT(S): at 03:39

## 2021-06-22 RX ADMIN — Medication 40 MILLIEQUIVALENT(S): at 15:36

## 2021-06-22 RX ADMIN — OLANZAPINE 5 MILLIGRAM(S): 15 TABLET, FILM COATED ORAL at 01:00

## 2021-06-22 NOTE — PROGRESS NOTE BEHAVIORAL HEALTH - NSBHADMITIPBHPROVFT_PSY_A_CORE
Spoke to patients psychiatrist Vickie Garcia [phone] Spoke to patients psychiatrist Vickie Garcia [phone]

## 2021-06-22 NOTE — H&P ADULT - ASSESSMENT
Pt is a 27 y/o female with Schizophrenia/Mild Hypokalemia    Management as per IPP  Repeat BMP  Medical Consult

## 2021-06-22 NOTE — PROGRESS NOTE BEHAVIORAL HEALTH - NSBHFUPADDHPIFT_PSY_A_CORE
As per chart review, HPI obtained by writer in the emergency room.  Interval CC and HPI in designated sections below.     26F, single, no children, living with family, with psych hx of schizophrenia, multiple recent suicide attempts, one prior hospitalization at [facility] in 2/2021, no substance use, currently in outpatient treatment, now BIB family for worsening psychosis and suicide ideation in the setting of medication nonadherence    Patient states that for the last few weeks she has been hearing a voice telling her to kill herself by jumping in front of a train, seeing faces change, paranoid thinking that [place] spies or aliens were spying on her, sleeping 4 hours per night, not tired during the day. She states that two weeks ago she tried to go to the train tracks to kill herself but her mother stopped her. Last week she states that she tried to kill herself by taking ibuprofen, states that she took around 7 pills with intent to die, did not tell anyone, and that yesterday she tried to kill herself by cutting her wrist with a razor. She states that while she hears a voice telling her to do this, she also wants to kill herself. She also states that at times the voice tells her to kill other people though she denies this currently. She states that she is supposed to take risperdal 1mg in the morning and 3mg in the evening, but that she has not taken this in 3 days. Also endorses ideas of reference and mind reading. Denies etoh and other drugs.     Regarding UA findings, patient denies frequent urination, burning on urination, any change in her urination from baseline

## 2021-06-22 NOTE — ED BEHAVIORAL HEALTH ASSESSMENT NOTE - VIOLENCE RISK FACTORS:
Feeling of being under threat and being unable to control threat/Violent ideation/threat/speech/Command hallucinations for violent behavior/Noncompliance with treatment

## 2021-06-22 NOTE — H&P ADULT - NSHPPHYSICALEXAM_GEN_ALL_CORE
Gen NAD, A&0X3  CV +S1 and S2, RR  Resp +air entry B/L  Abd soft, NT, ND  Ext no edema, no CT          Vital Signs Last 24 Hrs  T(C): 36.7 (22 Jun 2021 07:21), Max: 36.7 (22 Jun 2021 07:21)  T(F): 98 (22 Jun 2021 07:21), Max: 98 (22 Jun 2021 07:21)  HR: 90 (22 Jun 2021 07:21) (67 - 90)  BP: 105/93 (22 Jun 2021 07:21) (93/68 - 120/67)  BP(mean): --  RR: 16 (22 Jun 2021 07:21) (16 - 18)  SpO2: 99% (22 Jun 2021 07:21) (98% - 99%)

## 2021-06-22 NOTE — PROGRESS NOTE BEHAVIORAL HEALTH - SUMMARY
26F, single, no children, living with family, with psych hx of schizophrenia, multiple recent suicide attempts, one prior hospitalization at [facility] in 2/2021, no substance use, currently in outpatient treatment, now BIB family for worsening psychosis and suicide ideation in the setting of medication nonadherence    Patient states that for the last few weeks she has been hearing a voice telling her to kill herself by jumping in front of a train, seeing faces change, paranoid thinking that [place] spies or aliens were spying on her, sleeping 4 hours per night, not tired during the day. She states that two weeks ago she tried to go to the train tracks to kill herself but her mother stopped her. Last week she states that she tried to kill herself by taking ibuprofen, states that she took around 7 pills with intent to die, did not tell anyone, and that yesterday she tried to kill herself by cutting her wrist with a razor. She states that while she hears a voice telling her to do this, she also wants to kill herself. She also states that at times the voice tells her to kill other people though she denies this currently. She states that she is supposed to take risperdal 1mg in the morning and 3mg in the evening, but that she has not taken this in 3 days. Also endorses ideas of reference and mind reading. Denies etoh and other drugs.     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 As such she is at high risk of suicide and will require inpatient admission for safety, stabilization, and medication titration. 26F, single, no children, living with family, with psych hx of schizophrenia, multiple recent suicide attempts, one prior hospitalization at [facility] in 2/2021, no substance use, currently in outpatient treatment, now BIB family for worsening psychosis and suicide ideation in the setting of medication nonadherence    Patient states that for the last few weeks she has been hearing a voice telling her to kill herself by jumping in front of a train, seeing faces change, paranoid thinking that [place] spies or aliens were spying on her, sleeping 4 hours per night, not tired during the day. She states that two weeks ago she tried to go to the train tracks to kill herself but her mother stopped her. Last week she states that she tried to kill herself by taking ibuprofen, states that she took around 7 pills with intent to die, did not tell anyone, and that yesterday she tried to kill herself by cutting her wrist with a razor. She states that while she hears a voice telling her to do this, she also wants to kill herself. She also states that at times the voice tells her to kill other people though she denies this currently. She states that she is supposed to take risperdal 1mg in the morning and 3mg in the evening, but that she has not taken this in 3 days. Also endorses ideas of reference and mind reading. Denies etoh and other drugs.     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 As such she is at high risk of suicide and will require inpatient admission for safety, stabilization, and medication titration.    #Admit    #Schizophrenia  -Abilify 2mg daily    -Haldol 5mg Q6 PRN for agitation  -Lorazepam 2mg Q6 PRN for aggression    -Potassium 2 doses for Hypokalemia

## 2021-06-22 NOTE — H&P ADULT - NSICDXFAMILYHX_GEN_ALL_CORE_FT
FAMILY HISTORY:  Aunt  Still living? Unknown  Family history of schizophrenia, Age at diagnosis: Age Unknown

## 2021-06-22 NOTE — ED BEHAVIORAL HEALTH ASSESSMENT NOTE - SUMMARY
26F, single, no children, living with family, with psych hx of schizophrenia, multiple recent suicide attempts, one prior hospitalization at [facility] in 2/2021, no substance use, currently in outpatient treatment, now BIB family for worsening psychosis and suicide ideation in the setting of medication nonadherence 26F, single, no children, living with family, with psych hx of schizophrenia, multiple recent suicide attempts, one prior hospitalization at [facility] in 2/2021, no substance use, currently in outpatient treatment, now BIB family for worsening psychosis and suicide ideation in the setting of medication nonadherence.    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 As such she is at high risk of suicide and will require inpatient admission for safety, stabilization, and medication titration.    COVID Exposure Screen- Patient  1.	*Have you had a COVID-19 test in the last 90 days?  (  ) Yes   (X  ) No   (  ) Unknown- Reason: _____  IF YES PROCEED TO QUESTION #2. IF NO OR UNKNOWN, PLEASE SKIP TO QUESTION #3.  2.	Date of test(s) and result(s): ________  3.	*Have you tested positive for COVID-19 antibodies? (  ) Yes   (  X) No   (  ) Unknown- Reason: _____  IF YES PROCEED TO QUESTION #4. IF NO or UNKNOWN, PLEASE SKIP TO QUESTION #5.  4.	Date of positive antibody test: ________  5.	*Have you received 2 doses of the COVID-19 vaccine? (  ) Yes   (  X) No   (  ) Unknown- Reason: _____   IF YES PROCEED TO QUESTION #6. IF NO or UNKNOWN, PLEASE SKIP TO QUESTION #7.  6.	Date of second dose: ________  7.	*In the past 10 days, have you been around anyone with a positive COVID-19 test?* (  ) Yes   ( X) No   (  ) Unknown- Reason: ____  IF YES PROCEED TO QUESTION #8. IF NO or UNKNOWN, PLEASE SKIP TO QUESTION #13.  8.	Were you within 6 feet of them for at least 15 minutes? (  ) Yes   (  ) No   (  ) Unknown- Reason: _____  9.	Have you provided care for them? (  ) Yes   (  ) No   (  ) Unknown- Reason: ______  10.	Have you had direct physical contact with them (touched, hugged, or kissed them)? (  ) Yes   (  ) No    (  ) Unknown- Reason: _____  11.	Have you shared eating or drinking utensils with them? (  ) Yes   (  ) No    (  ) Unknown- Reason: ____  12.	Have they sneezed, coughed, or somehow gotten respiratory droplets on you? (  ) Yes   (  ) No    (  ) Unknown- Reason: ______  13.	*Have you been out of [place] within the past 10 days?* (  ) Yes   (X  ) No   (  ) Unknown- Reason: _____  IF YES PLEASE ANSWER THE FOLLOWING QUESTIONS:  14.	Which state/country have you been to? ______  15.	Were you there over 24 hours? (  ) Yes   (  ) No    (  ) Unknown- Reason: ______  16.	Date of return to [facility]: ______

## 2021-06-22 NOTE — PROGRESS NOTE BEHAVIORAL HEALTH - NSBHCHARTREVIEWVS_PSY_A_CORE FT
Vital Signs Last 24 Hrs  T(C): 36.7 (22 Jun 2021 07:21), Max: 36.7 (22 Jun 2021 07:21)  T(F): 98 (22 Jun 2021 07:21), Max: 98 (22 Jun 2021 07:21)  HR: 90 (22 Jun 2021 07:21) (67 - 90)  BP: 105/93 (22 Jun 2021 07:21) (93/68 - 120/67)  BP(mean): --  RR: 16 (22 Jun 2021 07:21) (16 - 18)  SpO2: 99% (22 Jun 2021 07:21) (98% - 99%)

## 2021-06-22 NOTE — PROGRESS NOTE BEHAVIORAL HEALTH - NSBHFUPINTERVALHXFT_PSY_A_CORE
Patient seen and evaluated on IPP. On approach patient calm and cooperative, no agitation or aggression noted. Patient states she has been having suicidal thoughts with a plan to jump in front of a train for a few weeks. States she hears voices periodically telling her to hurt herself and others. States she superficially scratched her wrist with a blade about a week ago. States she also took 7 Motrin in an attempt to kill herself. States she sees aliens a times and believes people are after her. Patient admits to being non compliant with Risperdal. Patient later forthcoming with information that she has not gotten a period on Risperdal and she feels like a Zombie. She did not disclose this Patient seen and evaluated on IPP. On approach patient calm and cooperative, no agitation or aggression noted. Patient states she has been having suicidal thoughts with a plan to jump in front of a train for a few weeks. States she hears voices periodically telling her to hurt herself and others. States she superficially scratched her wrist with a blade about a week ago. States she also took 7 Motrin in an attempt to kill herself. States she sees aliens a times and believes people are after her. Patient also admits to increased depression and anxiety. Admits to current passive suicidal ideations but states feels safe on the unit. Denies Homicidal ideations at this time. Patient admits to being non compliant with Risperdal. Patient later forthcoming with information that she has not gotten a period on Risperdal and she feels like a Zombie. She did not disclose this information to her psychiatrist. Patient denies any ETOH or illicit drug use. Patient COVID negative.    Collatrals: Patients mom Esther [phone]. Patients mon states the had COVID as a family in January. When cleared they went on a trip to [place] in February. During vacation mom states patient "Starting acting strange, was not eating, sleeping and talking to self" Patient was taken to hospital when they returned and diagnosed with schizophrenia. Patient started on Risperdal. Mom confirms patient has been not been compliant complaints of lack of a period and feeling like a zombie. States last week patient wanted to go look at trains. After mom questioning her patient admitted she wanted to Jump in front of a train.     Spoke to patients psychiatrist Vickie Garcia [phone]. Per psychiatrist patient referred to [facility] after initial psychotic episode. States patient has been non compliant with meds. Experiencing increased depression, SI, CAH, paranoia. Plan was to switch to a REYES. Psychiatrist was not aware that patient has not gotten a period since starting Risperdal and has been feeling like a "Zombie" Discussed switching to another Abilify and possible transition to REYES. Psychiatrist agreeable. Patient seen and evaluated on IPP. On approach patient calm and cooperative, no agitation or aggression noted. Patient states she has been having suicidal thoughts with a plan to jump in front of a train for a few weeks. States she hears voices periodically telling her to hurt herself and others. States she superficially scratched her wrist with a blade about a week ago. States she also took 7 Motrin in an attempt to kill herself. States she sees aliens a times and believes people are after her. Patient also admits to increased depression and anxiety. Admits to current passive suicidal ideations but states feels safe on the unit. Denies Homicidal ideations at this time. Patient admits to being non compliant with Risperdal. Patient later forthcoming with information that she has not gotten a period on Risperdal and she feels like a Zombie. She did not disclose this information to her psychiatrist. Patient denies any ETOH or illicit drug use. Patient COVID negative.    Collatrals: Patients mom Esther [phone]. Patients mon states the had COVID as a family in January. When cleared they went on a trip to [place] in February. During vacation mom states patient "Starting acting strange, was not eating, sleeping and talking to self" Patient was taken to hospital when they returned and diagnosed with schizophrenia. Patient started on Risperdal. Mom confirms patient has been not been compliant complaints of lack of a period and feeling like a zombie. States last week patient wanted to go look at trains. After mom questioning her patient admitted she wanted to Jump in front of a train.     Spoke to patients psychiatrist Vickie Garcia [phone]. Per psychiatrist patient referred to [facility] after initial psychotic episode. States patient has been non compliant with meds, Risperdal 1mg daily and 3mg bedtime. Experiencing increased depression, SI, CAH, paranoia. Plan was to switch to a REYES. Psychiatrist was not aware that patient has not gotten a period since starting Risperdal and has been feeling like a "Zombie" Discussed switching to another Abilify and possible transition to REYES. Psychiatrist agreeable.

## 2021-06-22 NOTE — PATIENT PROFILE BEHAVIORAL HEALTH - FUNCTIONAL SCREEN CURRENT LEVEL: EATING, MLM
0 = independent Star Wedge Flap Text: The defect edges were debeveled with a #15 scalpel blade.  Given the location of the defect, shape of the defect and the proximity to free margins a star wedge flap was deemed most appropriate.  Using a sterile surgical marker, an appropriate rotation flap was drawn incorporating the defect and placing the expected incisions within the relaxed skin tension lines where possible. The area thus outlined was incised deep to adipose tissue with a #15 scalpel blade.  The skin margins were undermined to an appropriate distance in all directions utilizing iris scissors.

## 2021-06-22 NOTE — ED BEHAVIORAL HEALTH ASSESSMENT NOTE - HPI (INCLUDE ILLNESS QUALITY, SEVERITY, DURATION, TIMING, CONTEXT, MODIFYING FACTORS, ASSOCIATED SIGNS AND SYMPTOMS)
26F, single, no children, living with family, with psych hx of schizophrenia, multiple recent suicide attempts, one prior hospitalization at [facility] in 2/2021, no substance use, currently in outpatient treatment, now BIB family for worsening psychosis and suicide ideation in the setting of medication nonadherence    Patient states that for the last few weeks she has been hearing a voice telling her to kill herself by jumping in front of a train, seeing faces change, paranoid thinking that [place] spies or aliens were spying on her, sleeping 4 hours per night, not tired during the day. She states that two weeks ago she tried to go to the train tracks to kill herself but her mother stopped her. Last week she states that she tried to kill herself by taking ibuprofen, states that she took around 7 pills with intent to die, did not tell anyone, and that yesterday she tried to kill herself by cutting her wrist with a razor. She states that while she hears a voice telling her to do this, she also wants to kill herself. She also states that at times the voice tells her to kill other people though she denies this currently. She states that she is supposed to take risperdal 1mg in the morning and 3mg in the evening, but that she has not taken this in 3 days. Also endorses ideas of reference and mind reading. Denies etoh and other drugs.     Regarding UA findings, patient denies frequent urination, burning on urination, any change in her urination from baseline

## 2021-06-22 NOTE — ED BEHAVIORAL HEALTH ASSESSMENT NOTE - NS ED BHA HOMICIDALITY PRESENT AGGRESSION OTHERS LIFETIME
Exclude Pending Lab and Radiology orders from printing on the Patient's Discharge Instructions, due to Privacy Concerns.
None known

## 2021-06-22 NOTE — H&P ADULT - NSHPLABSRESULTS_GEN_ALL_CORE
12.8   7.02  )-----------( 257      ( 2021 21:02 )             39.9       06-21    139  |  103  |  14  ----------------------------<  78  3.3<L>   |  30  |  0.97    Ca    9.1      2021 21:02    TPro  7.6  /  Alb  3.9  /  TBili  0.4  /  DBili  x   /  AST  12<L>  /  ALT  21  /  AlkPhos  45  06-21              Urinalysis Basic - ( 2021 21:02 )    Color: Yellow / Appearance: Slightly Turbid / S.010 / pH: x  Gluc: x / Ketone: Negative  / Bili: Negative / Urobili: Negative mg/dL   Blood: x / Protein: 15 mg/dL / Nitrite: Negative   Leuk Esterase: Moderate / RBC: 0-2 /HPF / WBC 11-25   Sq Epi: x / Non Sq Epi: Occasional / Bacteria: Few

## 2021-06-22 NOTE — ED BEHAVIORAL HEALTH ASSESSMENT NOTE - RISK ASSESSMENT
risk factors include CAHKS, insomnia, suicide attempts, medication nonadherence, paranoia, psychosis, psychotic disorder. Protective factors include lack of access to firearms, supportive family, stable housing, sobriety High Acute Suicide Risk

## 2021-06-22 NOTE — ED BEHAVIORAL HEALTH NOTE - BEHAVIORAL HEALTH NOTE
Telepsychiatry Reassessment Note:    MD received handoff on patient from Dr. Carreno.     MD assessed patient via JMEA. She appears somewhat flat, depressed, reports suicidal ideation and AH. She understands plan is for inpatient admission.     A/P from prior eval: " 26F, single, no children, living with family, with psych hx of schizophrenia, multiple recent suicide attempts, one prior hospitalization at [facility] in 2/2021, no substance use, currently in outpatient treatment, now BIB family for worsening psychosis and suicide ideation in the setting of medication nonadherence.    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 As such she is at high risk of suicide and will require inpatient admission for safety, stabilization, and medication titration."    - 9.27 legal status, transfer to [facility] for IPP

## 2021-06-22 NOTE — H&P ADULT - HISTORY OF PRESENT ILLNESS
Pt is a 25 y/o female with a hx of Schizophrenia admitted for further management.  She currently denies any physical complaints.    Pt found to have mild hypokalemia 3.3 on admission, pt given 40meq PO x 1 dose.

## 2021-06-22 NOTE — ED ADULT NURSE REASSESSMENT NOTE - NS ED NURSE REASSESS COMMENT FT1
Report received from GALINA Pacheco. Pt sleeping comfortably, awaiting transport for transfer to [facility]. 1:1 observation continued.

## 2021-06-22 NOTE — ED BEHAVIORAL HEALTH ASSESSMENT NOTE - PSYCHIATRIC ISSUES AND PLAN (INCLUDE STANDING AND PRN MEDICATION)
zyprexa 5mg PO qHS, please give first dose now. For agitation, haldol 5mg w ativan 2mg and benadryl 50mg PO or IM if severe

## 2021-06-22 NOTE — ED BEHAVIORAL HEALTH ASSESSMENT NOTE - DETAILS
Would not impact clinical decisionmaking at this time jump in front of train see above Discussed with ED attending Message left with mother

## 2021-06-23 LAB
A1C WITH ESTIMATED AVERAGE GLUCOSE RESULT: 5.4 % — SIGNIFICANT CHANGE UP (ref 4–5.6)
ALBUMIN SERPL ELPH-MCNC: 5.1 G/DL — SIGNIFICANT CHANGE UP (ref 3.5–5.2)
ALP SERPL-CCNC: 54 U/L — SIGNIFICANT CHANGE UP (ref 30–115)
ALT FLD-CCNC: 16 U/L — SIGNIFICANT CHANGE UP (ref 0–41)
ANION GAP SERPL CALC-SCNC: 7 MMOL/L — SIGNIFICANT CHANGE UP (ref 7–14)
AST SERPL-CCNC: 20 U/L — SIGNIFICANT CHANGE UP (ref 0–41)
BASOPHILS # BLD AUTO: 0.04 K/UL — SIGNIFICANT CHANGE UP (ref 0–0.2)
BASOPHILS NFR BLD AUTO: 0.6 % — SIGNIFICANT CHANGE UP (ref 0–1)
BILIRUB SERPL-MCNC: 0.3 MG/DL — SIGNIFICANT CHANGE UP (ref 0.2–1.2)
BUN SERPL-MCNC: 20 MG/DL — SIGNIFICANT CHANGE UP (ref 10–20)
CALCIUM SERPL-MCNC: 10.2 MG/DL — HIGH (ref 8.5–10.1)
CHLORIDE SERPL-SCNC: 105 MMOL/L — SIGNIFICANT CHANGE UP (ref 98–110)
CHOLEST SERPL-MCNC: 203 MG/DL — HIGH
CO2 SERPL-SCNC: 28 MMOL/L — SIGNIFICANT CHANGE UP (ref 17–32)
CREAT SERPL-MCNC: 1.1 MG/DL — SIGNIFICANT CHANGE UP (ref 0.7–1.5)
EOSINOPHIL # BLD AUTO: 0.1 K/UL — SIGNIFICANT CHANGE UP (ref 0–0.7)
EOSINOPHIL NFR BLD AUTO: 1.6 % — SIGNIFICANT CHANGE UP (ref 0–8)
ESTIMATED AVERAGE GLUCOSE: 108 MG/DL — SIGNIFICANT CHANGE UP (ref 68–114)
GLUCOSE SERPL-MCNC: 91 MG/DL — SIGNIFICANT CHANGE UP (ref 70–99)
HCT VFR BLD CALC: 44.9 % — SIGNIFICANT CHANGE UP (ref 37–47)
HDLC SERPL-MCNC: 55 MG/DL — SIGNIFICANT CHANGE UP
HGB BLD-MCNC: 14.3 G/DL — SIGNIFICANT CHANGE UP (ref 12–16)
IMM GRANULOCYTES NFR BLD AUTO: 0.3 % — SIGNIFICANT CHANGE UP (ref 0.1–0.3)
LIPID PNL WITH DIRECT LDL SERPL: 155 MG/DL — HIGH
LYMPHOCYTES # BLD AUTO: 3.51 K/UL — HIGH (ref 1.2–3.4)
LYMPHOCYTES # BLD AUTO: 56.1 % — HIGH (ref 20.5–51.1)
MCHC RBC-ENTMCNC: 26.9 PG — LOW (ref 27–31)
MCHC RBC-ENTMCNC: 31.8 G/DL — LOW (ref 32–37)
MCV RBC AUTO: 84.6 FL — SIGNIFICANT CHANGE UP (ref 81–99)
MONOCYTES # BLD AUTO: 0.48 K/UL — SIGNIFICANT CHANGE UP (ref 0.1–0.6)
MONOCYTES NFR BLD AUTO: 7.7 % — SIGNIFICANT CHANGE UP (ref 1.7–9.3)
NEUTROPHILS # BLD AUTO: 2.11 K/UL — SIGNIFICANT CHANGE UP (ref 1.4–6.5)
NEUTROPHILS NFR BLD AUTO: 33.7 % — LOW (ref 42.2–75.2)
NON HDL CHOLESTEROL: 148 MG/DL — HIGH
NRBC # BLD: 0 /100 WBCS — SIGNIFICANT CHANGE UP (ref 0–0)
PLATELET # BLD AUTO: 276 K/UL — SIGNIFICANT CHANGE UP (ref 130–400)
POTASSIUM SERPL-MCNC: 5.2 MMOL/L — HIGH (ref 3.5–5)
POTASSIUM SERPL-SCNC: 5.2 MMOL/L — HIGH (ref 3.5–5)
PROT SERPL-MCNC: 8 G/DL — SIGNIFICANT CHANGE UP (ref 6–8)
RBC # BLD: 5.31 M/UL — SIGNIFICANT CHANGE UP (ref 4.2–5.4)
RBC # FLD: 13.3 % — SIGNIFICANT CHANGE UP (ref 11.5–14.5)
SODIUM SERPL-SCNC: 140 MMOL/L — SIGNIFICANT CHANGE UP (ref 135–146)
TRIGL SERPL-MCNC: 54 MG/DL — SIGNIFICANT CHANGE UP
WBC # BLD: 6.26 K/UL — SIGNIFICANT CHANGE UP (ref 4.8–10.8)
WBC # FLD AUTO: 6.26 K/UL — SIGNIFICANT CHANGE UP (ref 4.8–10.8)

## 2021-06-23 PROCEDURE — 99252 IP/OBS CONSLTJ NEW/EST SF 35: CPT

## 2021-06-23 PROCEDURE — 99221 1ST HOSP IP/OBS SF/LOW 40: CPT

## 2021-06-23 PROCEDURE — 99231 SBSQ HOSP IP/OBS SF/LOW 25: CPT

## 2021-06-23 RX ORDER — HYDROXYZINE HCL 10 MG
25 TABLET ORAL EVERY 6 HOURS
Refills: 0 | Status: DISCONTINUED | OUTPATIENT
Start: 2021-06-23 | End: 2021-07-02

## 2021-06-23 RX ADMIN — ARIPIPRAZOLE 2 MILLIGRAM(S): 15 TABLET ORAL at 08:14

## 2021-06-23 NOTE — PROGRESS NOTE BEHAVIORAL HEALTH - NSBHFUPINTERVALHXFT_PSY_A_CORE
Patient seen and evaluated. As per nursing report no acute events. On approach patient laying in bed with covers over her head. Patient calm and cooperative, no agitation or aggression noted. Patient states she slept well. States she still hears the voices intermittently but they are less. Abilify started. Patient denies any adverse effect/reactions. No adverse effect/reactions noted. Patient continues to admit to passive suicidal ideation but able to contract for safety. Verbalizes feeling safe on the unit. Denies Homicidal ideations. Patient continues to present as disorganized, paranoid. Denies any s/s of COVID. Patient COVID negative. Patient seen and evaluated. As per nursing report no acute events. On approach patient laying in bed with covers over her head. Patient calm and cooperative, no agitation or aggression noted. Patient states she slept well. States she still hears the voices intermittently but they are less. Abilify started. Patient denies any adverse effect/reactions. No adverse effect/reactions noted. Patient continues to admit to passive suicidal ideation but able to contract for safety. Verbalizes feeling safe on the unit. Denies Homicidal ideations. Patient continues to present as disorganized, paranoid. Denies any s/s of COVID. Patient COVID negative.    Patients yannick Santana [phone] came for a visit. Writer met with patients mom briefly. Updated her on patients care and progress. Per patients mom patient looks a little better today than she did the last time she saw her.

## 2021-06-23 NOTE — CONSULT NOTE ADULT - SUBJECTIVE AND OBJECTIVE BOX
JENISE BENNETT  26y, Female  Allergy: No Known Allergies      CHIEF COMPLAINT:     HPI:  Pt is a 27 y/o female with a hx of Schizophrenia admitted for further management.  She currently denies any physical complaints.    Pt found to have mild hypokalemia 3.3 on admission, pt given 40meq PO x 1 dose. (22 Jun 2021 11:34)    HPI:    FAMILY HISTORY:  Family history of schizophrenia (Aunt)  Maternal aunt      PAST MEDICAL & SURGICAL HISTORY:  No pertinent past medical history    No significant past surgical history        SOCIAL HISTORY  Social History: denies etoh, drugs             VITALS:  T(F): 97.4, Max: 97.6 (06-23-21 @ 06:02)  HR: 66  BP: 112/73  RR: 16Vital Signs Last 24 Hrs  T(C): 36.3 (23 Jun 2021 15:47), Max: 36.4 (23 Jun 2021 06:02)  T(F): 97.4 (23 Jun 2021 15:47), Max: 97.6 (23 Jun 2021 06:02)  HR: 66 (23 Jun 2021 15:47) (66 - 79)  BP: 112/73 (23 Jun 2021 15:47) (95/55 - 112/73)  BP(mean): --  RR: 16 (23 Jun 2021 15:47) (16 - 16)  SpO2: --    PHYSICAL EXAM: seen earlier today  Gen: NAD, resting in bed covered by sheet denies any compalints   HEENT: Normocephalic, atraumatic  Neck: supple, no lymphadenopathy  CV: Regular rate & regular rhythm  Lungs: decreased BS at bases, no fremitus  Abdomen: Soft, BS present  Ext: Warm, well perfused  Neuro: non focal, awake  Skin: no rash, no erythema  Psych: no SI, HI, Hallucination     TESTS & MEASUREMENTS:                        14.3   6.26  )-----------( 276      ( 23 Jun 2021 08:47 )             44.9     06-23    140  |  105  |  20  ----------------------------<  91  5.2<H>   |  28  |  1.1    Ca    10.2<H>      23 Jun 2021 08:47    TPro  8.0  /  Alb  5.1  /  TBili  0.3  /  DBili  x   /  AST  20  /  ALT  16  /  AlkPhos  54  06-23    eGFR if Non : 69 mL/min/1.73M2 (06-23-21 @ 08:47)  eGFR if : 80 mL/min/1.73M2 (06-23-21 @ 08:47)    LIVER FUNCTIONS - ( 23 Jun 2021 08:47 )  Alb: 5.1 g/dL / Pro: 8.0 g/dL / ALK PHOS: 54 U/L / ALT: 16 U/L / AST: 20 U/L / GGT: x                   QRS axis to [] ° and NSR at a rate of [] BPM. There was no atrial enlargement. There was no ventricular hypertrophy. There were no ST-T changes and all intervals were normal.      INFECTIOUS DISEASES TESTING      RADIOLOGY & ADDITIONAL TESTS:  I have personally reviewed the last Chest xray  CXR      CT      CARDIOLOGY TESTING  12 Lead ECG:   Ventricular Rate 64 BPM    Atrial Rate 64 BPM    P-R Interval 162 ms    QRS Duration 86 ms    Q-T Interval 420 ms    QTC Calculation(Bazett) 433 ms    P Axis 39 degrees    R Axis -28 degrees    T Axis 14 degrees    Diagnosis Line Normal sinus rhythm  Normal ECG  When compared with ECG of 21-FEB-2021 20:50,  No significant change was found  Confirmed by JUANITA CATES, KARINA ([id number]) on 6/22/2021 1:52:21 PM (06-21-21 @ 23:39)      MEDICATIONS  ARIPiprazole 2  MEDICATIONS  (STANDING):  ARIPiprazole 2 milliGRAM(s) Oral daily    MEDICATIONS  (PRN):  haloperidol     Tablet 5 milliGRAM(s) Oral every 6 hours PRN Agitation  hydrOXYzine hydrochloride 25 milliGRAM(s) Oral every 6 hours PRN Anxiety/insomnia  LORazepam     Tablet 2 milliGRAM(s) Oral every 6 hours PRN Aggression            All available historical data has been reviewed    ASSESSMENT  26y F admitted with schizoprenia  medication adjustment and therapy per psychiatry   repeat potassium   no further medical recommendations   recall prn    JENISE BENNETT  26y, Female  Allergy: No Known Allergies      CHIEF COMPLAINT:     HPI:  Pt is a 27 y/o female with a hx of Schizophrenia admitted for further management. + hallucinations and hx of suicide attempts  She currently denies any physical complaints.    Pt found to have mild hypokalemia 3.3 on admission, pt given 40meq PO x 1 dose. (22 Jun 2021 11:34)    HPI:    FAMILY HISTORY:  Family history of schizophrenia (Aunt)  Maternal aunt      PAST MEDICAL & SURGICAL HISTORY:  No pertinent past medical history    No significant past surgical history        SOCIAL HISTORY  Social History: denies etoh, drugs             VITALS:  T(F): 97.4, Max: 97.6 (06-23-21 @ 06:02)  HR: 66  BP: 112/73  RR: 16Vital Signs Last 24 Hrs  T(C): 36.3 (23 Jun 2021 15:47), Max: 36.4 (23 Jun 2021 06:02)  T(F): 97.4 (23 Jun 2021 15:47), Max: 97.6 (23 Jun 2021 06:02)  HR: 66 (23 Jun 2021 15:47) (66 - 79)  BP: 112/73 (23 Jun 2021 15:47) (95/55 - 112/73)  BP(mean): --  RR: 16 (23 Jun 2021 15:47) (16 - 16)  SpO2: --    PHYSICAL EXAM: seen earlier today  Gen: NAD, resting in bed covered by sheet denies any compalints   HEENT: Normocephalic, atraumatic  Neck: supple, no lymphadenopathy  CV: Regular rate & regular rhythm  Lungs: decreased BS at bases, no fremitus  Abdomen: Soft, BS present  Ext: Warm, well perfused  Neuro: non focal, awake  Skin: no rash, no erythema  Psych: no SI, HI, +Hallucination auditory     TESTS & MEASUREMENTS:                        14.3   6.26  )-----------( 276      ( 23 Jun 2021 08:47 )             44.9     06-23    140  |  105  |  20  ----------------------------<  91  5.2<H>   |  28  |  1.1    Ca    10.2<H>      23 Jun 2021 08:47    TPro  8.0  /  Alb  5.1  /  TBili  0.3  /  DBili  x   /  AST  20  /  ALT  16  /  AlkPhos  54  06-23    eGFR if Non : 69 mL/min/1.73M2 (06-23-21 @ 08:47)  eGFR if : 80 mL/min/1.73M2 (06-23-21 @ 08:47)    LIVER FUNCTIONS - ( 23 Jun 2021 08:47 )  Alb: 5.1 g/dL / Pro: 8.0 g/dL / ALK PHOS: 54 U/L / ALT: 16 U/L / AST: 20 U/L / GGT: x                   QRS axis to [] ° and NSR at a rate of [] BPM. There was no atrial enlargement. There was no ventricular hypertrophy. There were no ST-T changes and all intervals were normal.      INFECTIOUS DISEASES TESTING      RADIOLOGY & ADDITIONAL TESTS:  I have personally reviewed the last Chest xray  CXR      CT      CARDIOLOGY TESTING  12 Lead ECG:   Ventricular Rate 64 BPM    Atrial Rate 64 BPM    P-R Interval 162 ms    QRS Duration 86 ms    Q-T Interval 420 ms    QTC Calculation(Bazett) 433 ms    P Axis 39 degrees    R Axis -28 degrees    T Axis 14 degrees    Diagnosis Line Normal sinus rhythm  Normal ECG  When compared with ECG of 21-FEB-2021 20:50,  No significant change was found  Confirmed by JUANITA CATES, KARINA ([id number]) on 6/22/2021 1:52:21 PM (06-21-21 @ 23:39)      MEDICATIONS  ARIPiprazole 2  MEDICATIONS  (STANDING):  ARIPiprazole 2 milliGRAM(s) Oral daily    MEDICATIONS  (PRN):  haloperidol     Tablet 5 milliGRAM(s) Oral every 6 hours PRN Agitation  hydrOXYzine hydrochloride 25 milliGRAM(s) Oral every 6 hours PRN Anxiety/insomnia  LORazepam     Tablet 2 milliGRAM(s) Oral every 6 hours PRN Aggression            All available historical data has been reviewed    ASSESSMENT  26y F admitted with schizoprenia  medication adjustment and therapy per psychiatry   repeat potassium   no further medical recommendations   recall prn

## 2021-06-23 NOTE — PROGRESS NOTE BEHAVIORAL HEALTH - NSBHCHARTREVIEWVS_PSY_A_CORE FT
Vital Signs Last 24 Hrs  T(C): 35.9 (23 Jun 2021 08:00), Max: 36.4 (23 Jun 2021 06:02)  T(F): 96.7 (23 Jun 2021 08:00), Max: 97.6 (23 Jun 2021 06:02)  HR: 79 (23 Jun 2021 08:00) (69 - 80)  BP: 104/79 (23 Jun 2021 08:00) (95/55 - 106/67)  BP(mean): --  RR: 16 (23 Jun 2021 08:00) (16 - 16)  SpO2: --

## 2021-06-23 NOTE — PROGRESS NOTE BEHAVIORAL HEALTH - SUMMARY
26F, single, no children, living with family, with psych hx of schizophrenia, multiple recent suicide attempts, one prior hospitalization at [facility] in 2/2021, no substance use, currently in outpatient treatment, now BIB family for worsening psychosis and suicide ideation in the setting of medication nonadherence    Patient states that for the last few weeks she has been hearing a voice telling her to kill herself by jumping in front of a train, seeing faces change, paranoid thinking that [place] spies or aliens were spying on her, sleeping 4 hours per night, not tired during the day. She states that two weeks ago she tried to go to the train tracks to kill herself but her mother stopped her. Last week she states that she tried to kill herself by taking ibuprofen, states that she took around 7 pills with intent to die, did not tell anyone, and that yesterday she tried to kill herself by cutting her wrist with a razor. She states that while she hears a voice telling her to do this, she also wants to kill herself. She also states that at times the voice tells her to kill other people though she denies this currently. She states that she is supposed to take risperdal 1mg in the morning and 3mg in the evening, but that she has not taken this in 3 days. Also endorses ideas of reference and mind reading. Denies etoh and other drugs.     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 As such she is at high risk of suicide and will require inpatient admission for safety, stabilization, and medication titration.    Patient seen and evaluated. As per nursing report no acute events. On approach patient laying in bed with covers over her head. Patient calm and cooperative, no agitation or aggression noted. Patient states she slept well. States she still hears the voices intermittently but they are less. Abilify started. Patient denies any adverse effect/reactions. No adverse effect/reactions noted. Patient continues to admit to passive suicidal ideation but able to contract for safety. Verbalizes feeling safe on the unit. Denies Homicidal ideations. Patient continues to present as disorganized, paranoid. Denies any s/s of COVID. Patient COVID negative.    #Admit    #Schizophrenia  -Abilify 2mg daily    -Haldol 5mg Q6 PRN for agitation  -Lorazepam 2mg Q6 PRN for aggression  -Hydroxyzine 25mg Q6 PRN     -Potassium 2 doses for Hypokalemia 26F, single, no children, living with family, with psych hx of schizophrenia, multiple recent suicide attempts, one prior hospitalization at [facility] in 2/2021, no substance use, currently in outpatient treatment, now BIB family for worsening psychosis and suicide ideation in the setting of medication nonadherence    Patient states that for the last few weeks she has been hearing a voice telling her to kill herself by jumping in front of a train, seeing faces change, paranoid thinking that [place] spies or aliens were spying on her, sleeping 4 hours per night, not tired during the day. She states that two weeks ago she tried to go to the train tracks to kill herself but her mother stopped her. Last week she states that she tried to kill herself by taking ibuprofen, states that she took around 7 pills with intent to die, did not tell anyone, and that yesterday she tried to kill herself by cutting her wrist with a razor. She states that while she hears a voice telling her to do this, she also wants to kill herself. She also states that at times the voice tells her to kill other people though she denies this currently. She states that she is supposed to take risperdal 1mg in the morning and 3mg in the evening, but that she has not taken this in 3 days. Also endorses ideas of reference and mind reading. Denies etoh and other drugs.     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 As such she is at high risk of suicide and will require inpatient admission for safety, stabilization, and medication titration.    Patient seen and evaluated. As per nursing report no acute events. On approach patient laying in bed with covers over her head. Patient calm and cooperative, no agitation or aggression noted. Patient states she slept well. States she still hears the voices intermittently but they are less. Abilify started. Patient denies any adverse effect/reactions. No adverse effect/reactions noted. Patient continues to admit to passive suicidal ideation but able to contract for safety. Verbalizes feeling safe on the unit. Denies Homicidal ideations. Patient continues to present as disorganized, paranoid. Denies any s/s of COVID. Patient COVID negative.    #Admit    #Schizophrenia  -Abilify 2mg daily    -Haldol 5mg Q6 PRN for agitation  -Lorazepam 2mg Q6 PRN for aggression  -Hydroxyzine 25mg Q6 PRN

## 2021-06-24 LAB
COVID-19 SPIKE DOMAIN AB INTERP: POSITIVE
COVID-19 SPIKE DOMAIN ANTIBODY RESULT: 205 U/ML — HIGH
POTASSIUM SERPL-MCNC: 4.6 MMOL/L — SIGNIFICANT CHANGE UP (ref 3.5–5)
POTASSIUM SERPL-SCNC: 4.6 MMOL/L — SIGNIFICANT CHANGE UP (ref 3.5–5)
SARS-COV-2 IGG+IGM SERPL QL IA: 205 U/ML — HIGH
SARS-COV-2 IGG+IGM SERPL QL IA: POSITIVE

## 2021-06-24 PROCEDURE — 99231 SBSQ HOSP IP/OBS SF/LOW 25: CPT

## 2021-06-24 RX ADMIN — ARIPIPRAZOLE 2 MILLIGRAM(S): 15 TABLET ORAL at 08:05

## 2021-06-24 NOTE — PROGRESS NOTE BEHAVIORAL HEALTH - NSBHFUPINTERVALHXFT_PSY_A_CORE
Patient seen and evaluated. As per nursing report no acute events. On approach patient laying in bed with covers over her head. Patient calm and cooperative, no agitation or aggression noted. Patient states she slept well. States the voices are "subsiding" Patient denies suicidal/homicidal ideations. Able to contract for safety. Verbalizes feeling safe on the unit. Patient less disorganized, less paranoid today. States she had a good visit with mom yesterday. Patient isolative to the room. Writer encouraged patient to get out of the room and attend group. Denies any s/s of COVID. Patient COVID negative.

## 2021-06-24 NOTE — PROGRESS NOTE BEHAVIORAL HEALTH - NSBHCHARTREVIEWVS_PSY_A_CORE FT
Vital Signs Last 24 Hrs  T(C): 36.7 (24 Jun 2021 07:31), Max: 36.7 (24 Jun 2021 07:31)  T(F): 98.1 (24 Jun 2021 07:31), Max: 98.1 (24 Jun 2021 07:31)  HR: 79 (24 Jun 2021 07:31) (66 - 79)  BP: 105/67 (24 Jun 2021 07:31) (100/66 - 112/73)  BP(mean): --  RR: 18 (24 Jun 2021 07:31) (16 - 20)  SpO2: --

## 2021-06-24 NOTE — PROGRESS NOTE BEHAVIORAL HEALTH - SUMMARY
26F, single, no children, living with family, with psych hx of schizophrenia, multiple recent suicide attempts, one prior hospitalization at [facility] in 2/2021, no substance use, currently in outpatient treatment, now BIB family for worsening psychosis and suicide ideation in the setting of medication nonadherence    Patient states that for the last few weeks she has been hearing a voice telling her to kill herself by jumping in front of a train, seeing faces change, paranoid thinking that [place] spies or aliens were spying on her, sleeping 4 hours per night, not tired during the day. She states that two weeks ago she tried to go to the train tracks to kill herself but her mother stopped her. Last week she states that she tried to kill herself by taking ibuprofen, states that she took around 7 pills with intent to die, did not tell anyone, and that yesterday she tried to kill herself by cutting her wrist with a razor. She states that while she hears a voice telling her to do this, she also wants to kill herself. She also states that at times the voice tells her to kill other people though she denies this currently. She states that she is supposed to take risperdal 1mg in the morning and 3mg in the evening, but that she has not taken this in 3 days. Also endorses ideas of reference and mind reading. Denies etoh and other drugs.     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 As such she is at high risk of suicide and will require inpatient admission for safety, stabilization, and medication titration.    Patient seen and evaluated. As per nursing report no acute events. On approach patient laying in bed with covers over her head. Patient calm and cooperative, no agitation or aggression noted. Patient states she slept well. States the voices are "subsiding" Patient denies suicidal/homicidal ideations. Able to contract for safety. Verbalizes feeling safe on the unit. Patient less disorganized, less paranoid today. States she had a good visit with mom yesterday. Patient isolative to the room. Writer encouraged patient to get out of the room and attend group. Denies any s/s of COVID. Patient COVID negative.    #Admit    #Schizophrenia  -Abilify 2mg daily    -Haldol 5mg Q6 PRN for agitation  -Lorazepam 2mg Q6 PRN for aggression  -Hydroxyzine 25mg Q6 PRN

## 2021-06-25 LAB
APPEARANCE UR: CLEAR — SIGNIFICANT CHANGE UP
BACTERIA # UR AUTO: ABNORMAL
BILIRUB UR-MCNC: NEGATIVE — SIGNIFICANT CHANGE UP
COLOR SPEC: YELLOW — SIGNIFICANT CHANGE UP
DIFF PNL FLD: NEGATIVE — SIGNIFICANT CHANGE UP
EPI CELLS # UR: ABNORMAL /HPF
GLUCOSE UR QL: NEGATIVE MG/DL — SIGNIFICANT CHANGE UP
KETONES UR-MCNC: NEGATIVE — SIGNIFICANT CHANGE UP
LEUKOCYTE ESTERASE UR-ACNC: ABNORMAL
NITRITE UR-MCNC: NEGATIVE — SIGNIFICANT CHANGE UP
PH UR: 6 — SIGNIFICANT CHANGE UP (ref 5–8)
PROT UR-MCNC: ABNORMAL MG/DL
RBC CASTS # UR COMP ASSIST: SIGNIFICANT CHANGE UP /HPF
SP GR SPEC: 1.02 — SIGNIFICANT CHANGE UP (ref 1.01–1.03)
UROBILINOGEN FLD QL: 0.2 MG/DL — SIGNIFICANT CHANGE UP (ref 0.2–0.2)
WBC UR QL: SIGNIFICANT CHANGE UP /HPF

## 2021-06-25 PROCEDURE — 99231 SBSQ HOSP IP/OBS SF/LOW 25: CPT

## 2021-06-25 RX ADMIN — ARIPIPRAZOLE 2 MILLIGRAM(S): 15 TABLET ORAL at 09:26

## 2021-06-25 NOTE — PROGRESS NOTE BEHAVIORAL HEALTH - NSBHCHARTREVIEWVS_PSY_A_CORE FT
Vital Signs Last 24 Hrs  T(C): 35.6 (25 Jun 2021 08:24), Max: 36.6 (24 Jun 2021 15:43)  T(F): 96 (25 Jun 2021 08:24), Max: 97.8 (24 Jun 2021 15:43)  HR: 72 (25 Jun 2021 08:24) (69 - 80)  BP: 91/- (25 Jun 2021 08:24) (91/- - 99/67)  BP(mean): --  RR: 16 (25 Jun 2021 08:24) (16 - 19)  SpO2: --

## 2021-06-25 NOTE — PROGRESS NOTE BEHAVIORAL HEALTH - NSBHFUPINTERVALHXFT_PSY_A_CORE
Patient seen and evaluated in treatment team. As per nursing report no acute events. On approach patient in TV room watching TV. Patient calm and cooperative, no agitation or aggression noted. Patient states she slept well. Denies hearing any voices since yesterday morning. Patient denies active suicidal ideations. Able to contract for safety. Verbalizes feeling safe on the unit. Denies homicidal ideations. Patient less disorganized today. Patient less isolative to the room. States she attended a group yesterday. Denies any s/s of COVID. Patient COVID negative. Patient seen and evaluated in treatment team. As per nursing report no acute events. On approach patient in TV room watching TV. Patient calm and cooperative, no agitation or aggression noted. Patient states she slept well. Denies hearing any voices since yesterday morning. Patient denies active suicidal ideations. Able to contract for safety. Verbalizes feeling safe on the unit. Denies homicidal ideations. Patient less disorganized today. Patient less isolative to the room. States she attended a group yesterday. Denies any s/s of COVID. Patient COVID negative.    Updated patients psychiatrist Vickie Garcia [phone] on patients care and progress.

## 2021-06-25 NOTE — PROGRESS NOTE BEHAVIORAL HEALTH - SUMMARY
26F, single, no children, living with family, with psych hx of schizophrenia, multiple recent suicide attempts, one prior hospitalization at [facility] in 2/2021, no substance use, currently in outpatient treatment, now BIB family for worsening psychosis and suicide ideation in the setting of medication nonadherence    Patient states that for the last few weeks she has been hearing a voice telling her to kill herself by jumping in front of a train, seeing faces change, paranoid thinking that [place] spies or aliens were spying on her, sleeping 4 hours per night, not tired during the day. She states that two weeks ago she tried to go to the train tracks to kill herself but her mother stopped her. Last week she states that she tried to kill herself by taking ibuprofen, states that she took around 7 pills with intent to die, did not tell anyone, and that yesterday she tried to kill herself by cutting her wrist with a razor. She states that while she hears a voice telling her to do this, she also wants to kill herself. She also states that at times the voice tells her to kill other people though she denies this currently. She states that she is supposed to take risperdal 1mg in the morning and 3mg in the evening, but that she has not taken this in 3 days. Also endorses ideas of reference and mind reading. Denies etoh and other drugs.     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 As such she is at high risk of suicide and will require inpatient admission for safety, stabilization, and medication titration.    Patient seen and evaluated in treatment team. As per nursing report no acute events. On approach patient in TV room watching TV. Patient calm and cooperative, no agitation or aggression noted. Patient states she slept well. Denies hearing any voices since yesterday morning. Patient denies active suicidal ideations. Able to contract for safety. Verbalizes feeling safe on the unit. Denies homicidal ideations. Patient less disorganized today. Patient less isolative to the room. States she attended a group yesterday. Denies any s/s of COVID. Patient COVID negative.    #Admit    #Schizophrenia  -Abilify 2mg daily    -Haldol 5mg Q6 PRN for agitation  -Lorazepam 2mg Q6 PRN for aggression  -Hydroxyzine 25mg Q6 PRN

## 2021-06-26 PROCEDURE — 99231 SBSQ HOSP IP/OBS SF/LOW 25: CPT

## 2021-06-26 RX ADMIN — ARIPIPRAZOLE 2 MILLIGRAM(S): 15 TABLET ORAL at 08:23

## 2021-06-26 NOTE — BH SAFETY PLAN - LOCAL URGENT CARE ADDRESS
BRANDON dentate line, anterior anal tag, anal condyloma [facility] is located on the first floor of the [facility], within the campus of [facility]. The main entrance to our building is at the corner of [address] [place] [address] and [address] [place] [address] in [place], [place]. You can also access our campus through the hospital entrance at [address] [place] [address]

## 2021-06-26 NOTE — BH SAFETY PLAN - STEP 6 SAFE ENVIRONMENT
One way I would make my environment for me would be to let someone aware of how I am feeling when I get in that funky mood.

## 2021-06-26 NOTE — BH SAFETY PLAN - LOCAL URGENT CARE NAME
[facility], part of [facility]’s Adult Outpatient Psychiatry Department

## 2021-06-26 NOTE — PROGRESS NOTE BEHAVIORAL HEALTH - NSBHFUPINTERVALHXFT_PSY_A_CORE
Patient seen and evaluated in her room. As per nursing report no acute events, no PRNs last night, pt is adherent to tx. On approach patient iis calm and cooperative, no agitation or aggression noted. She is oddly related with excessive smiling and blunted affect. Patient states she slept well. Denies AH/VH this morning. Patient denies suicidal ideations or HI. When asked why she was admitted, she said she had a "psychotic episode" when she "saw an alien and travelled back in time." At the same time, she still believes that these events were real. Denies any s/s of COVID. Patient COVID negative.

## 2021-06-26 NOTE — BH SAFETY PLAN - THE ONE THING THAT IS MOST IMPORTANT TO ME AND WORTH LIVING FOR IS:
The one thing that is most important to me and worth living for would be the well being of my family.

## 2021-06-26 NOTE — PROGRESS NOTE BEHAVIORAL HEALTH - NSBHCHARTREVIEWVS_PSY_A_CORE FT
Vital Signs Last 24 Hrs  T(C): 36.4 (26 Jun 2021 08:36), Max: 36.8 (25 Jun 2021 15:37)  T(F): 97.6 (26 Jun 2021 08:36), Max: 98.3 (25 Jun 2021 15:37)  HR: 77 (26 Jun 2021 08:36) (67 - 77)  BP: 104/55 (26 Jun 2021 08:36) (90/60 - 107/75)  BP(mean): --  RR: 18 (26 Jun 2021 08:36) (16 - 20)  SpO2: --

## 2021-06-26 NOTE — PROGRESS NOTE BEHAVIORAL HEALTH - SUMMARY
26F, single, no children, living with family, with psych hx of schizophrenia, multiple recent suicide attempts, one prior hospitalization at [facility] in 2/2021, no substance use, currently in outpatient treatment, now BIB family for worsening psychosis and suicide ideation in the setting of medication nonadherence    Patient states that for the last few weeks she has been hearing a voice telling her to kill herself by jumping in front of a train, seeing faces change, paranoid thinking that [place] spies or aliens were spying on her, sleeping 4 hours per night, not tired during the day. She states that two weeks ago she tried to go to the train tracks to kill herself but her mother stopped her. Last week she states that she tried to kill herself by taking ibuprofen, states that she took around 7 pills with intent to die, did not tell anyone, and that yesterday she tried to kill herself by cutting her wrist with a razor. She states that while she hears a voice telling her to do this, she also wants to kill herself. She also states that at times the voice tells her to kill other people though she denies this currently. She states that she is supposed to take risperdal 1mg in the morning and 3mg in the evening, but that she has not taken this in 3 days. Also endorses ideas of reference and mind reading. Denies etoh and other drugs.     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 As such she is at high risk of suicide and will require inpatient admission for safety, stabilization, and medication titration.      #Admit    #Schizophrenia  -Abilify 2mg daily    -Haldol 5mg Q6 PRN for agitation  -Lorazepam 2mg Q6 PRN for aggression  -Hydroxyzine 25mg Q6 PRN

## 2021-06-27 PROCEDURE — 99233 SBSQ HOSP IP/OBS HIGH 50: CPT

## 2021-06-27 RX ADMIN — Medication 25 MILLIGRAM(S): at 20:11

## 2021-06-27 RX ADMIN — ARIPIPRAZOLE 2 MILLIGRAM(S): 15 TABLET ORAL at 08:16

## 2021-06-27 RX ADMIN — HALOPERIDOL DECANOATE 5 MILLIGRAM(S): 100 INJECTION INTRAMUSCULAR at 17:04

## 2021-06-27 NOTE — PROGRESS NOTE BEHAVIORAL HEALTH - SUMMARY
Patient has  CAHKS and has made several attempts in the last two weeks, with ongoing CAHKS and suicide ideation, unable to contract for safety in the hospital. As such she is at high risk of suicide and will require inpatient admission for safety, stabilization, and medication titration.

## 2021-06-27 NOTE — PROGRESS NOTE BEHAVIORAL HEALTH - NSBHFUPINTERVALHXFT_PSY_A_CORE
Patient seen and evaluated in her room. As per nursing report no acute events, no PRNs last night, pt is adherent to tx. On approach patient is calm and cooperative, no agitation or aggression noted.  Patient states she slept very well. Denies AH/VH this morning. Patient denies suicidal ideations or HI. When asked why she was admitted, she said she had a "psychotic episode" when she "saw an alien and travelled back in time." At the same time, she still believes that these events were real.  she is compliant with meds and tolerats it well.

## 2021-06-28 PROCEDURE — 99231 SBSQ HOSP IP/OBS SF/LOW 25: CPT

## 2021-06-28 RX ORDER — ARIPIPRAZOLE 15 MG/1
5 TABLET ORAL DAILY
Refills: 0 | Status: DISCONTINUED | OUTPATIENT
Start: 2021-06-29 | End: 2021-07-02

## 2021-06-28 RX ADMIN — ARIPIPRAZOLE 2 MILLIGRAM(S): 15 TABLET ORAL at 08:36

## 2021-06-28 NOTE — PROGRESS NOTE BEHAVIORAL HEALTH - SUMMARY
26F, single, no children, living with family, with psych hx of schizophrenia, multiple recent suicide attempts, one prior hospitalization at [facility] in 2/2021, no substance use, currently in outpatient treatment, now BIB family for worsening psychosis and suicide ideation in the setting of medication nonadherence    Patient states that for the last few weeks she has been hearing a voice telling her to kill herself by jumping in front of a train, seeing faces change, paranoid thinking that [place] spies or aliens were spying on her, sleeping 4 hours per night, not tired during the day. She states that two weeks ago she tried to go to the train tracks to kill herself but her mother stopped her. Last week she states that she tried to kill herself by taking ibuprofen, states that she took around 7 pills with intent to die, did not tell anyone, and that yesterday she tried to kill herself by cutting her wrist with a razor. She states that while she hears a voice telling her to do this, she also wants to kill herself. She also states that at times the voice tells her to kill other people though she denies this currently. She states that she is supposed to take risperdal 1mg in the morning and 3mg in the evening, but that she has not taken this in 3 days. Also endorses ideas of reference and mind reading. Denies etoh and other drugs.     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 As such she is at high risk of suicide and will require inpatient admission for safety, stabilization, and medication titration.    Patient seen and evaluated. As per nursing report PRN Haldol given over the weekend. On approach patient calm and cooperative, no agitation or aggression noted. Patient states she slept well and appetite has improved. Admits to hearing voices once this weekend, Could not make out what they were saying. Will increase Abilify. Patient denies suicidal/homicidal ideations. Able to contract for safety. Verbalizes feeling safe on the unit. Patient less isolative to the room. Visible on the unit watching tv and attending group. Denies any s/s of COVID. Patient COVID negative.    #Admit    #Schizophrenia  -Abilify 5mg daily    -Haldol 5mg Q6 PRN for agitation  -Lorazepam 2mg Q6 PRN for aggression  -Hydroxyzine 25mg Q6 PRN    #Handoff  -Increase Abilify

## 2021-06-28 NOTE — PROGRESS NOTE BEHAVIORAL HEALTH - NSBHFUPINTERVALHXFT_PSY_A_CORE
Patient seen and evaluated. As per nursing report PRN Haldol given over the weekend. On approach patient calm and cooperative, no agitation or aggression noted. Patient states she slept well and appetite has improved. Admits to hearing voices once this weekend, Could not make out what they were saying. Will increase Abilify. Patient denies suicidal/homicidal ideations. Able to contract for safety. Verbalizes feeling safe on the unit. Patient less isolative to the room. Visible on the unit watching tv and attending group. Denies any s/s of COVID. Patient COVID negative.    #Handoff  -Increase Abilify

## 2021-06-28 NOTE — PROGRESS NOTE BEHAVIORAL HEALTH - NSBHCHARTREVIEWVS_PSY_A_CORE FT
Vital Signs Last 24 Hrs  T(C): 36 (28 Jun 2021 10:04), Max: 36 (28 Jun 2021 10:04)  T(F): 96.8 (28 Jun 2021 10:04), Max: 96.8 (28 Jun 2021 10:04)  HR: 63 (28 Jun 2021 10:04) (56 - 70)  BP: 99/55 (28 Jun 2021 10:04) (93/70 - 99/55)  BP(mean): --  RR: 16 (28 Jun 2021 10:04) (16 - 16)  SpO2: --

## 2021-06-29 PROCEDURE — 99231 SBSQ HOSP IP/OBS SF/LOW 25: CPT

## 2021-06-29 RX ADMIN — ARIPIPRAZOLE 5 MILLIGRAM(S): 15 TABLET ORAL at 11:44

## 2021-06-29 RX ADMIN — Medication 25 MILLIGRAM(S): at 21:16

## 2021-06-29 NOTE — PROGRESS NOTE BEHAVIORAL HEALTH - NSBHFUPINTERVALHXFT_PSY_A_CORE
Patient seen and evaluated. As per nursing report no acute events. On approach patient calm and cooperative, no agitation or aggression noted. Patient states she slept well and appetite has improved. Denies A/V hallucinations at this time. Abilify increased. Denies any side effects/ adverse reactions. No side effect/adverse reactions noted. Patient denies suicidal/homicidal ideations. Able to contract for safety. Verbalizes feeling safe on the unit. Writer encouraged patient to get out of her room, engage with peers and attend groups. Denies any s/s of COVID. Patient COVID negative. Patient seen and evaluated. As per nursing report no acute events. On approach patient calm and cooperative, no agitation or aggression noted. Patient states she slept well and appetite has improved. Denies A/V hallucinations at this time. Abilify increased. Denies any side effects/ adverse reactions. No side effect/adverse reactions noted. Patient denies suicidal/homicidal ideations. Able to contract for safety. Verbalizes feeling safe on the unit. Writer encouraged patient to get out of her room, engage with peers and attend groups. Denies any s/s of COVID. Patient COVID negative.    Spoke to patients father Yao [phone]. Updated him on patients care and progress Patient seen and evaluated. As per nursing report no acute events. On approach patient calm and cooperative, no agitation or aggression noted. Patient states she slept well and appetite has improved. Denies A/V hallucinations at this time. Patient denies suicidal/homicidal ideations. Able to contract for safety. Verbalizes feeling safe on the unit. Patient making progress. Anticipated discharge Friday 7/2/21 as long as patient continues to progress. Patient visible on the unit engaging with peers and watching TV in the TV room. Denies any s/s of COVID. Patient COVID negative. Patient seen and evaluated. As per nursing report no acute events. On approach patient calm and cooperative, no agitation or aggression noted. Patient states she slept well and appetite has improved. Denies A/V hallucinations at this time. Abilify increased. Denies any side effect/adverse reactions. No side effects/adverse reactions noted. Patient denies suicidal/homicidal ideations. Able to contract for safety. Verbalizes feeling safe on the unit. Patient making progress. Patient visible on the unit engaging with peers and watching TV in the TV room. Denies any s/s of COVID. Patient COVID negative.    Spoke to patients father Yao. Updated him on patients care and progress. Patients father in agreement with discharge prior to holiday weekend as long as patient is stable to be discharged home.

## 2021-06-29 NOTE — PROGRESS NOTE BEHAVIORAL HEALTH - NSBHCHARTREVIEWVS_PSY_A_CORE FT
Vital Signs Last 24 Hrs  T(C): 36.2 (29 Jun 2021 07:57), Max: 36.2 (29 Jun 2021 07:57)  T(F): 97.1 (29 Jun 2021 07:57), Max: 97.1 (29 Jun 2021 07:57)  HR: 71 (29 Jun 2021 07:57) (68 - 71)  BP: 98/55 (29 Jun 2021 07:57) (93/56 - 103/62)  BP(mean): --  RR: 16 (29 Jun 2021 07:57) (16 - 18)  SpO2: -- Vital Signs Last 24 Hrs  T(C): 36.8 (30 Jun 2021 08:05), Max: 36.9 (30 Jun 2021 05:48)  T(F): 98.2 (30 Jun 2021 08:05), Max: 98.5 (30 Jun 2021 05:48)  HR: 72 (30 Jun 2021 08:05) (65 - 80)  BP: 101/62 (30 Jun 2021 08:05) (90/53 - 105/80)  BP(mean): --  RR: 16 (30 Jun 2021 08:05) (16 - 18)  SpO2: --

## 2021-06-29 NOTE — PROGRESS NOTE BEHAVIORAL HEALTH - SUMMARY
26F, single, no children, living with family, with psych hx of schizophrenia, multiple recent suicide attempts, one prior hospitalization at [facility] in 2/2021, no substance use, currently in outpatient treatment, now BIB family for worsening psychosis and suicide ideation in the setting of medication nonadherence    Patient states that for the last few weeks she has been hearing a voice telling her to kill herself by jumping in front of a train, seeing faces change, paranoid thinking that [place] spies or aliens were spying on her, sleeping 4 hours per night, not tired during the day. She states that two weeks ago she tried to go to the train tracks to kill herself but her mother stopped her. Last week she states that she tried to kill herself by taking ibuprofen, states that she took around 7 pills with intent to die, did not tell anyone, and that yesterday she tried to kill herself by cutting her wrist with a razor. She states that while she hears a voice telling her to do this, she also wants to kill herself. She also states that at times the voice tells her to kill other people though she denies this currently. She states that she is supposed to take risperdal 1mg in the morning and 3mg in the evening, but that she has not taken this in 3 days. Also endorses ideas of reference and mind reading. Denies etoh and other drugs.     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 As such she is at high risk of suicide and will require inpatient admission for safety, stabilization, and medication titration.    Patient seen and evaluated. As per nursing report no acute events. On approach patient calm and cooperative, no agitation or aggression noted. Patient states she slept well and appetite has improved. Denies A/V hallucinations at this time. Abilify increased. Denies any side effects/ adverse reactions. No side effect/adverse reactions noted. Patient denies suicidal/homicidal ideations. Able to contract for safety. Verbalizes feeling safe on the unit. Writer encouraged patient to get out of her room, engage with peers and attend groups. Denies any s/s of COVID. Patient COVID negative.    #Admit    #Schizophrenia  -Abilify 5mg daily    -Haldol 5mg Q6 PRN for agitation  -Lorazepam 2mg Q6 PRN for aggression  -Hydroxyzine 25mg Q6 PRN 26F, single, no children, living with family, with psych hx of schizophrenia, multiple recent suicide attempts, one prior hospitalization at [facility] in 2/2021, no substance use, currently in outpatient treatment, now BIB family for worsening psychosis and suicide ideation in the setting of medication nonadherence    Patient states that for the last few weeks she has been hearing a voice telling her to kill herself by jumping in front of a train, seeing faces change, paranoid thinking that [place] citizen spies or aliens were spying on her, sleeping 4 hours per night, not tired during the day. She states that two weeks ago she tried to go to the train tracks to kill herself but her mother stopped her. Last week she states that she tried to kill herself by taking ibuprofen, states that she took around 7 pills with intent to die, did not tell anyone, and that yesterday she tried to kill herself by cutting her wrist with a razor. She states that while she hears a voice telling her to do this, she also wants to kill herself. She also states that at times the voice tells her to kill other people though she denies this currently. She states that she is supposed to take risperdal 1mg in the morning and 3mg in the evening, but that she has not taken this in 3 days. Also endorses ideas of reference and mind reading. Denies etoh and other drugs.     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 As such she is at high risk of suicide and will require inpatient admission for safety, stabilization, and medication titration.    Patient seen and evaluated. As per nursing report no acute events. On approach patient calm and cooperative, no agitation or aggression noted. Patient states she slept well and appetite has improved. Denies A/V hallucinations at this time. Patient denies suicidal/homicidal ideations. Able to contract for safety. Verbalizes feeling safe on the unit. Patient making progress. Anticipated discharge Friday 7/2/21 as long as patient continues to progress. Patient visible on the unit engaging with peers and watching TV in the TV room. Denies any s/s of COVID. Patient COVID negative.    #Admit    #Schizophrenia  -Abilify 5mg daily    -Haldol 5mg Q6 PRN for agitation  -Lorazepam 2mg Q6 PRN for aggression  -Hydroxyzine 25mg Q6 PRN 26F, single, no children, living with family, with psych hx of schizophrenia, multiple recent suicide attempts, one prior hospitalization at [facility] in 2/2021, no substance use, currently in outpatient treatment, now BIB family for worsening psychosis and suicide ideation in the setting of medication nonadherence    Patient states that for the last few weeks she has been hearing a voice telling her to kill herself by jumping in front of a train, seeing faces change, paranoid thinking that [place] spies or aliens were spying on her, sleeping 4 hours per night, not tired during the day. She states that two weeks ago she tried to go to the train tracks to kill herself but her mother stopped her. Last week she states that she tried to kill herself by taking ibuprofen, states that she took around 7 pills with intent to die, did not tell anyone, and that yesterday she tried to kill herself by cutting her wrist with a razor. She states that while she hears a voice telling her to do this, she also wants to kill herself. She also states that at times the voice tells her to kill other people though she denies this currently. She states that she is supposed to take risperdal 1mg in the morning and 3mg in the evening, but that she has not taken this in 3 days. Also endorses ideas of reference and mind reading. Denies etoh and other drugs.     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 As such she is at high risk of suicide and will require inpatient admission for safety, stabilization, and medication titration.    Patient seen and evaluated. As per nursing report no acute events. On approach patient calm and cooperative, no agitation or aggression noted. Patient states she slept well and appetite has improved. Denies A/V hallucinations at this time. Abilify increased. Denies any side effect/adverse reactions. No side effects/adverse reactions noted. Patient denies suicidal/homicidal ideations. Able to contract for safety. Verbalizes feeling safe on the unit. Patient making progress. Patient visible on the unit engaging with peers and watching TV in the TV room. Denies any s/s of COVID. Patient COVID negative.    #Admit    #Schizophrenia  -Abilify 5mg daily    -Haldol 5mg Q6 PRN for agitation  -Lorazepam 2mg Q6 PRN for aggression  -Hydroxyzine 25mg Q6 PRN

## 2021-06-29 NOTE — DISCHARGE NOTE BEHAVIORAL HEALTH - MEDICATION SUMMARY - MEDICATIONS TO STOP TAKING
I will STOP taking the medications listed below when I get home from the hospital:    RisperDAL 4 mg oral tablet  -- 1 tab(s) by mouth 2 times a day

## 2021-06-29 NOTE — DISCHARGE NOTE BEHAVIORAL HEALTH - NSBHDCSUICFCTROTHERFT_PSY_A_CORE
Patient and provider identified future stressors as being psychosocial stressors with family, non compliance with medication/outpatient follow up

## 2021-06-29 NOTE — DISCHARGE NOTE BEHAVIORAL HEALTH - MEDICATION SUMMARY - MEDICATIONS TO TAKE
I will START or STAY ON the medications listed below when I get home from the hospital:    ARIPiprazole 5 mg oral tablet  -- 1 tab(s) by mouth once a day x 14 days.  Continue as prescribed until told otherwise by your provider.    -- Indication: For Schizophrenia    hydrOXYzine hydrochloride 25 mg oral tablet  -- 1 tab(s) by mouth once a day (at bedtime) x 14 days, As Needed -Anxiety/insomnia.  Continue as prescribed until told otherwise by your provider.    -- Indication: For Insomnia

## 2021-06-29 NOTE — DISCHARGE NOTE BEHAVIORAL HEALTH - NSBHDCTHERAPYFT_PSY_A_CORE
Patient was provided with milieu therapy as well as daily supportive psychotherapy. Patient has been actively engaged in treatment, attending several groups. Patient has attended group related to illness management and recovery. Reinforced positive coping skills learned on the unit

## 2021-06-29 NOTE — DISCHARGE NOTE BEHAVIORAL HEALTH - HPI (INCLUDE ILLNESS QUALITY, SEVERITY, DURATION, TIMING, CONTEXT, MODIFYING FACTORS, ASSOCIATED SIGNS AND SYMPTOMS)
26F, single, no children, living with family, with psych hx of schizophrenia, multiple recent suicide attempts, one prior hospitalization at [facility] in 2/2021, no substance use, currently in outpatient treatment, now BIB family for worsening psychosis and suicide ideation in the setting of medication nonadherence    Patient states that for the last few weeks she has been hearing a voice telling her to kill herself by jumping in front of a train, seeing faces change, paranoid thinking that [place]iqu[place] spies or aliens were spying on her, sleeping 4 hours per night, not tired during the day. She states that two weeks ago she tried to go to the train tracks to kill herself but her mother stopped her. Last week she states that she tried to kill herself by taking ibuprofen, states that she took around 7 pills with intent to die, did not tell anyone, and that yesterday she tried to kill herself by cutting her wrist with a razor. She states that while she hears a voice telling her to do this, she also wants to kill herself. She also states that at times the voice tells her to kill other people though she denies this currently. She states that she is supposed to take risperdal 1mg in the morning and 3mg in the evening, but that she has not taken this in 3 days. Also endorses ideas of reference and mind reading. Denies etoh and other drugs.     Patient seen and evaluated on IPP. On approach patient calm and cooperative, no agitation or aggression noted. Patient states she has been having suicidal thoughts with a plan to jump in front of a train for a few weeks. States she hears voices periodically telling her to hurt herself and others. States she superficially scratched her wrist with a blade about a week ago. States she also took 7 Motrin in an attempt to kill herself. States she sees aliens a times and believes people are after her. Patient also admits to increased depression and anxiety. Admits to current passive suicidal ideations but states feels safe on the unit. Denies Homicidal ideations at this time. Patient admits to being non compliant with Risperdal. Patient later forthcoming with information that she has not gotten a period on Risperdal and she feels like a Zombie. She did not disclose this information to her psychiatrist. Patient denies any ETOH or illicit drug use. Patient COVID negative.    Spoke to patients psychiatrist Vickie Garcia [phone]. Per psychiatrist patient referred to [facility] after initial psychotic episode. States patient has been non compliant with meds, Risperdal 1mg daily and 3mg bedtime. Experiencing increased depression, SI, CAH, paranoia. Plan was to switch to a REYES. Psychiatrist was not aware that patient has not gotten a period since starting Risperdal and has been feeling like a "Zombie" Discussed switching to another Abilify and possible transition to REYES. Psychiatrist agreeable. 26F, single, no children, living with family, with psych hx of schizophrenia, multiple recent suicide attempts, one prior hospitalization at [facility] in 2/2021, no substance use, currently in outpatient treatment, now BIB family for worsening psychosis and suicide ideation in the setting of medication nonadherence    Patient states that for the last few weeks she has been hearing a voice telling her to kill herself by jumping in front of a train, seeing faces change, paranoid thinking that [place] spies or aliens were spying on her, sleeping 4 hours per night, not tired during the day. She states that two weeks ago she tried to go to the train tracks to kill herself but her mother stopped her. Last week she states that she tried to kill herself by taking ibuprofen, states that she took around 7 pills with intent to die, did not tell anyone, and that yesterday she tried to kill herself by cutting her wrist with a razor. She states that while she hears a voice telling her to do this, she also wants to kill herself. She also states that at times the voice tells her to kill other people though she denies this currently. She states that she is supposed to take risperdal 1mg in the morning and 3mg in the evening, but that she has not taken this in 3 days. Also endorses ideas of reference and mind reading. Denies etoh and other drugs.     Patient seen and evaluated on IPP. On approach patient calm and cooperative, no agitation or aggression noted. Patient states she has been having suicidal thoughts with a plan to jump in front of a train for a few weeks. States she hears voices periodically telling her to hurt herself and others. States she superficially scratched her wrist with a blade about a week ago. States she also took 7 Motrin in an attempt to kill herself. States she sees aliens a times and believes people are after her. Patient also admits to increased depression and anxiety. Admits to current passive suicidal ideations but states feels safe on the unit. Denies Homicidal ideations at this time. Patient admits to being non compliant with Risperdal. Patient later forthcoming with information that she has not gotten a period on Risperdal and she feels like a Zombie. She did not disclose this information to her psychiatrist. Patient denies any ETOH or illicit drug use. Patient COVID negative.    Spoke to patients psychiatrist Vickie Garcia [phone]. Per psychiatrist patient referred to [facility] after initial psychotic episode. States patient has been non compliant with meds, Risperdal 1mg daily and 3mg bedtime. Experiencing increased depression, SI, CAH, paranoia. Plan was to switch to a REYES. Psychiatrist was not aware that patient has not gotten a period since starting Risperdal and has been feeling like a "Zombie" Discussed switching to another Abilify and possible transition to REYES. Psychiatrist agreeable.    Patient seen and evaluated 7/1/21. As per nursing report no acute events. On approach patient calm and cooperative, no agitation or aggression noted. Patient states she slept well and appetite has improved. Denies A/V hallucinations at this time. Patient denies suicidal/homicidal ideations. Able to contract for safety. Verbalizes feeling safe on the unit. Anticipated discharge tomorrow 7/2/21. Patient has a good support system, lives with family and is already set up with outpatient treatment. Patient visible on the unit engaging with peers and watching TV in the TV room and attending group. Denies any s/s of COVID. Patient COVID negative.    Spoke to patients father Yao [phone] updated him on patients care and progress and anticipated discharge tomorrow. Father has no issues with discharge

## 2021-06-29 NOTE — DISCHARGE NOTE BEHAVIORAL HEALTH - NSBHDCSUICPROTECTFT_PSY_A_CORE
Patient denies suicidal ideations. Responsibility to family and others, Identifies reasons for living, Has future plans, Supportive social network of family or friends,

## 2021-06-29 NOTE — DISCHARGE NOTE BEHAVIORAL HEALTH - NSBHDCSUICFCTRMIT_PSY_A_CORE
Patient can rely on her parents. Has been medication compliant, not endorsing any side effects. Patient is future oriented and optimistic about starting Outpatient. Patient verbalizes reasons for living. Patient to use positive coping skills learned during the course of the inpatient hospitalization. Patient verbalized willingness to seek care in moment of crisis. Patient is agreeable that should she have any thoughts of hurting herself or others, she will call 911, return to the ED or call the National Suicide Prevention Lifeline, immediately.

## 2021-06-29 NOTE — DISCHARGE NOTE BEHAVIORAL HEALTH - NSBHDCRESPONSEFT_PSY_A_CORE
Patient has made progress over the course of this hospitalization. With continuous psychotherapy from the treatment team and the medications, patient reports feeling better, presenting with better insight and judgement. Patient denied any suicidal or homicidal ideations. Patient denied any auditory or visual hallucinations. Patient is future oriented, optimistic and motivated to participate in Outpatient treatment. Patient is stable for discharge and shows no imminent danger to self, others at this time. Patient understands and verbalized agreement with treatment plan and following up with outpatient. Psychoeducation provided regarding diagnosis, medications, treatment and follow up. Risks, benefits, alternatives discussed, all questions and concerns addressed and answered.

## 2021-06-29 NOTE — DISCHARGE NOTE BEHAVIORAL HEALTH - NSBHDCCONDITIONFT_PSY_A_CORE
Compliant with medication. Does not warrant continued Inpatient hospitalization. Patient does not present an imminent risk to self or others at this time.

## 2021-06-30 PROCEDURE — 99231 SBSQ HOSP IP/OBS SF/LOW 25: CPT

## 2021-06-30 RX ADMIN — ARIPIPRAZOLE 5 MILLIGRAM(S): 15 TABLET ORAL at 08:25

## 2021-06-30 NOTE — PROGRESS NOTE BEHAVIORAL HEALTH - NSBHCHARTREVIEWVS_PSY_A_CORE FT
Vital Signs Last 24 Hrs  T(C): 36.8 (30 Jun 2021 08:05), Max: 36.9 (30 Jun 2021 05:48)  T(F): 98.2 (30 Jun 2021 08:05), Max: 98.5 (30 Jun 2021 05:48)  HR: 72 (30 Jun 2021 08:05) (65 - 80)  BP: 101/62 (30 Jun 2021 08:05) (90/53 - 105/80)  BP(mean): --  RR: 16 (30 Jun 2021 08:05) (16 - 18)  SpO2: --

## 2021-06-30 NOTE — PROGRESS NOTE BEHAVIORAL HEALTH - NSBHFUPINTERVALHXFT_PSY_A_CORE
Patient seen and evaluated. As per nursing report no acute events. On approach patient calm and cooperative, no agitation or aggression noted. Patient states she slept well and appetite has improved. Denies A/V hallucinations at this time. Patient denies suicidal/homicidal ideations. Able to contract for safety. Verbalizes feeling safe on the unit. Patient making progress. Anticipated discharge Friday 7/2/21 as long as patient continues to progress. Patient visible on the unit engaging with peers and watching TV in the TV room. Denies any s/s of COVID. Patient COVID negative. Patient seen and evaluated. As per nursing report no acute events. On approach patient calm and cooperative, no agitation or aggression noted. Patient states she slept well and appetite has improved. Denies A/V hallucinations at this time. Patient denies suicidal/homicidal ideations. Able to contract for safety. Verbalizes feeling safe on the unit. Patient making progress. Anticipated discharge Friday 7/2/21 as long as patient continues to progress. Patient visible on the unit engaging with peers and watching TV in the TV room and attending group. Denies any s/s of COVID. Patient COVID negative.

## 2021-06-30 NOTE — PROGRESS NOTE BEHAVIORAL HEALTH - SUMMARY
26F, single, no children, living with family, with psych hx of schizophrenia, multiple recent suicide attempts, one prior hospitalization at [facility] in 2/2021, no substance use, currently in outpatient treatment, now BIB family for worsening psychosis and suicide ideation in the setting of medication nonadherence    Patient states that for the last few weeks she has been hearing a voice telling her to kill herself by jumping in front of a train, seeing faces change, paranoid thinking that [place] spies or aliens were spying on her, sleeping 4 hours per night, not tired during the day. She states that two weeks ago she tried to go to the train tracks to kill herself but her mother stopped her. Last week she states that she tried to kill herself by taking ibuprofen, states that she took around 7 pills with intent to die, did not tell anyone, and that yesterday she tried to kill herself by cutting her wrist with a razor. She states that while she hears a voice telling her to do this, she also wants to kill herself. She also states that at times the voice tells her to kill other people though she denies this currently. She states that she is supposed to take risperdal 1mg in the morning and 3mg in the evening, but that she has not taken this in 3 days. Also endorses ideas of reference and mind reading. Denies etoh and other drugs.     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 As such she is at high risk of suicide and will require inpatient admission for safety, stabilization, and medication titration.    Patient seen and evaluated. As per nursing report no acute events. On approach patient calm and cooperative, no agitation or aggression noted. Patient states she slept well and appetite has improved. Denies A/V hallucinations at this time. Patient denies suicidal/homicidal ideations. Able to contract for safety. Verbalizes feeling safe on the unit. Patient making progress. Anticipated discharge Friday 7/2/21 as long as patient continues to progress. Patient visible on the unit engaging with peers and watching TV in the TV room. Denies any s/s of COVID. Patient COVID negative.    #Admit    #Schizophrenia  -Abilify 5mg daily    -Haldol 5mg Q6 PRN for agitation  -Lorazepam 2mg Q6 PRN for aggression  -Hydroxyzine 25mg Q6 PRN 26F, single, no children, living with family, with psych hx of schizophrenia, multiple recent suicide attempts, one prior hospitalization at [facility] in 2/2021, no substance use, currently in outpatient treatment, now BIB family for worsening psychosis and suicide ideation in the setting of medication nonadherence    Patient states that for the last few weeks she has been hearing a voice telling her to kill herself by jumping in front of a train, seeing faces change, paranoid thinking that [place] spies or aliens were spying on her, sleeping 4 hours per night, not tired during the day. She states that two weeks ago she tried to go to the train tracks to kill herself but her mother stopped her. Last week she states that she tried to kill herself by taking ibuprofen, states that she took around 7 pills with intent to die, did not tell anyone, and that yesterday she tried to kill herself by cutting her wrist with a razor. She states that while she hears a voice telling her to do this, she also wants to kill herself. She also states that at times the voice tells her to kill other people though she denies this currently. She states that she is supposed to take risperdal 1mg in the morning and 3mg in the evening, but that she has not taken this in 3 days. Also endorses ideas of reference and mind reading. Denies etoh and other drugs.     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 As such she is at high risk of suicide and will require inpatient admission for safety, stabilization, and medication titration.    Patient seen and evaluated. As per nursing report no acute events. On approach patient calm and cooperative, no agitation or aggression noted. Patient states she slept well and appetite has improved. Denies A/V hallucinations at this time. Patient denies suicidal/homicidal ideations. Able to contract for safety. Verbalizes feeling safe on the unit. Patient making progress. Anticipated discharge Friday 7/2/21 as long as patient continues to progress. Patient visible on the unit engaging with peers and watching TV in the TV room attending group. Denies any s/s of COVID. Patient COVID negative.    #Admit    #Schizophrenia  -Abilify 5mg daily    -Haldol 5mg Q6 PRN for agitation  -Lorazepam 2mg Q6 PRN for aggression  -Hydroxyzine 25mg Q6 PRN

## 2021-07-01 PROCEDURE — 99231 SBSQ HOSP IP/OBS SF/LOW 25: CPT

## 2021-07-01 RX ORDER — HYDROXYZINE HCL 10 MG
1 TABLET ORAL
Qty: 14 | Refills: 0
Start: 2021-07-01 | End: 2021-07-14

## 2021-07-01 RX ORDER — RISPERIDONE 4 MG/1
1 TABLET ORAL
Qty: 28 | Refills: 0
Start: 2021-07-01 | End: 2021-07-14

## 2021-07-01 RX ORDER — ARIPIPRAZOLE 15 MG/1
1 TABLET ORAL
Qty: 14 | Refills: 0
Start: 2021-07-01 | End: 2021-07-14

## 2021-07-01 RX ORDER — RISPERIDONE 4 MG/1
1 TABLET ORAL
Qty: 0 | Refills: 0 | DISCHARGE

## 2021-07-01 RX ADMIN — ARIPIPRAZOLE 5 MILLIGRAM(S): 15 TABLET ORAL at 08:24

## 2021-07-01 NOTE — PROGRESS NOTE BEHAVIORAL HEALTH - RISK ASSESSMENT
risk factors include CAHKS, insomnia, suicide attempts, medication nonadherence, paranoia, psychosis, psychotic disorder. Protective factors include lack of access to firearms, supportive family, stable housing, sobriety
risk factors include CAHKS, insomnia, suicide attempts, medication nonadherence, paranoia, psychosis, psychotic disorder. Protective factors include lack of access to firearms, supportive family, stable housing, sobriety.
risk factors include CAHKS, insomnia, suicide attempts, medication nonadherence, paranoia, psychosis, psychotic disorder. Protective factors include lack of access to firearms, supportive family, stable housing, sobriety

## 2021-07-01 NOTE — PROGRESS NOTE BEHAVIORAL HEALTH - NSBHATTESTSEENBY_PSY_A_CORE
attending Psychiatrist without NP/Trainee
NP without Attending Psychiatrist
attending Psychiatrist without NP/Trainee
NP without Attending Psychiatrist

## 2021-07-01 NOTE — PROGRESS NOTE BEHAVIORAL HEALTH - NSBHCHARTREVIEWVS_PSY_A_CORE FT
Vital Signs Last 24 Hrs  T(C): 36.4 (01 Jul 2021 08:16), Max: 36.4 (01 Jul 2021 08:16)  T(F): 97.6 (01 Jul 2021 08:16), Max: 97.6 (01 Jul 2021 08:16)  HR: 74 (01 Jul 2021 08:16) (73 - 78)  BP: 85/49 (01 Jul 2021 08:16) (85/49 - 90/63)  BP(mean): --  RR: 16 (01 Jul 2021 06:04) (16 - 16)  SpO2: --

## 2021-07-01 NOTE — PROGRESS NOTE BEHAVIORAL HEALTH - SUMMARY
26F, single, no children, living with family, with psych hx of schizophrenia, multiple recent suicide attempts, one prior hospitalization at [facility] in 2/2021, no substance use, currently in outpatient treatment, now BIB family for worsening psychosis and suicide ideation in the setting of medication nonadherence    Patient states that for the last few weeks she has been hearing a voice telling her to kill herself by jumping in front of a train, seeing faces change, paranoid thinking that [place] spies or aliens were spying on her, sleeping 4 hours per night, not tired during the day. She states that two weeks ago she tried to go to the train tracks to kill herself but her mother stopped her. Last week she states that she tried to kill herself by taking ibuprofen, states that she took around 7 pills with intent to die, did not tell anyone, and that yesterday she tried to kill herself by cutting her wrist with a razor. She states that while she hears a voice telling her to do this, she also wants to kill herself. She also states that at times the voice tells her to kill other people though she denies this currently. She states that she is supposed to take risperdal 1mg in the morning and 3mg in the evening, but that she has not taken this in 3 days. Also endorses ideas of reference and mind reading. Denies etoh and other drugs.     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 As such she is at high risk of suicide and will require inpatient admission for safety, stabilization, and medication titration.    Patient seen and evaluated. As per nursing report no acute events. On approach patient calm and cooperative, no agitation or aggression noted. Patient states she slept well and appetite has improved. Denies A/V hallucinations at this time. Patient denies suicidal/homicidal ideations. Able to contract for safety. Verbalizes feeling safe on the unit. Anticipated discharge tomorrow 7/2/21. Patient has a good support system, lives with family and is already set up with outpatient treatment. Patient visible on the unit engaging with peers and watching TV in the TV room and attending group. Denies any s/s of COVID. Patient COVID negative.    #Admit    #Schizophrenia  -Abilify 5mg daily    -Haldol 5mg Q6 PRN for agitation  -Lorazepam 2mg Q6 PRN for aggression  -Hydroxyzine 25mg Q6 PRN

## 2021-07-01 NOTE — PROGRESS NOTE BEHAVIORAL HEALTH - ATTENTION / CONCENTRATION
Clinical Pharmacy Consult Note: Coumadin Dosing and Education     Beni Cosby 's Coumadin will be dosed and monitored by Pharmacy at the request of Dr. Ruby.    Indication: chronic afib   Target INR is 2-3.   INR   Date Value Ref Range Status   12/31/2017 >10.0 (HH) 0.8 - 1.2 Final     Comment:     Coumadin Therapy:  2.0 - 3.0 for INR for all indicators except mechanical heart valves  and antiphospholipid syndromes which should use 2.5 - 3.5.  INR, PTT   critical result(s) called and verbal readback obtained   from Heriberto Germain RN., 12/31/2017 08:51       Patient received phytonadione oral tablet 12/31/2017. Patient's Coumadin will be held at this time.     Please note: A warfarin 1 mg by mouth on Saturday placeholder dose only has been entered.      Patient will be initiated on Coumadin 7.5mg Tues, Sat and 5mg all other days of the week.  PT/INR will be monitored daily. Dose adjustments will be made accordingly.      Thank you for allowing us to participate in this patient's care.     Leonor Santa 12/31/2017 11:49 AM   
Normal

## 2021-07-01 NOTE — PROGRESS NOTE BEHAVIORAL HEALTH - NSBHADMITIPOBSFT_PSY_A_CORE
safety
As per unit protocol

## 2021-07-01 NOTE — PROGRESS NOTE BEHAVIORAL HEALTH - PRIMARY DX
Schizophrenia

## 2021-07-01 NOTE — PROGRESS NOTE BEHAVIORAL HEALTH - AFFECT CONGRUENCE
Not congruent

## 2021-07-01 NOTE — PROGRESS NOTE BEHAVIORAL HEALTH - THOUGHT CONTENT
Unremarkable
Delusions
Unremarkable
Delusions
Unremarkable
Delusions

## 2021-07-01 NOTE — PROGRESS NOTE BEHAVIORAL HEALTH - AFFECT QUALITY
Depressed
Other/Euthymic
Other/Euthymic
Depressed
Other/Euthymic
Depressed
Depressed

## 2021-07-01 NOTE — PROGRESS NOTE BEHAVIORAL HEALTH - NSBHCHARTREVIEWLAB_PSY_A_CORE FT
Complete Blood Count + Automated Diff (06.21.21 @ 21:02)   WBC Count: 7.02 K/uL   RBC Count: 4.68 M/uL   Hemoglobin: 12.8 g/dL   Hematocrit: 39.9    Mean Cell Volume: 85.3 fl   Mean Cell Hemoglobin: 27.4 pg   Mean Cell Hemoglobin Conc: 32.1 gm/dL   Red Cell Distrib Width: 13.7    Platelet Count - Automated: 257 K/uL   Auto Neutrophil #: 2.90 K/uL   Auto Lymphocyte #: 3.23 K/uL   Auto Monocyte #: 0.71 K/uL   Auto Eosinophil #: 0.12 K/uL   Auto Basophil #: 0.05 K/uL   Auto Neutrophil %: 41.4:    Auto Lymphocyte %: 46.0    Auto Monocyte %: 10.1    Auto Eosinophil %: 1.7    Auto Basophil %: 0.7    Auto Immature Granulocyte %: 0.1: (Includes meta, myelo and promyelocytes)   Nucleated RBC: 0 /100 WBCs  Comprehensive Metabolic Panel (06.21.21 @ 21:02)   Sodium, Serum: 139 mmol/L   Potassium, Serum: 3.3 mmol/L   Chloride, Serum: 103 mmol/L   Carbon Dioxide, Serum: 30 mmol/L   Anion Gap, Serum: 6 mmol/L   Blood Urea Nitrogen, Serum: 14 mg/dL   Creatinine, Serum: 0.97 mg/dL   Glucose, Serum: 78 mg/dL   Calcium, Total Serum: 9.1 mg/dL   Protein Total, Serum: 7.6 gm/dL   Albumin, Serum: 3.9 g/dL   Bilirubin Total, Serum: 0.4 mg/dL   Alkaline Phosphatase, Serum: 45 U/L   Aspartate Aminotransferase (AST/SGOT): 12 U/L   Alanine Aminotransferase (ALT/SGPT): 21 U/L   eGFR if Non : 81eGFR if : 93 mL/min/1.73M2

## 2021-07-01 NOTE — PROGRESS NOTE BEHAVIORAL HEALTH - THOUGHT PROCESS
Linear
Other/Disorganized
Linear
Illogical/Other/Disorganized
Linear
Linear
Disorganized
Illogical/Other/Disorganized

## 2021-07-01 NOTE — PROGRESS NOTE BEHAVIORAL HEALTH - NSBHADMITIPOBS_PSY_A_CORE
Enhanced supervision

## 2021-07-01 NOTE — PROGRESS NOTE BEHAVIORAL HEALTH - NSBHFUPTYPE_PSY_A_CORE
Inpatient-On Service Note
Inpatient

## 2021-07-01 NOTE — PROGRESS NOTE BEHAVIORAL HEALTH - NSBHFUPINTERVALHXFT_PSY_A_CORE
Patient seen and evaluated. As per nursing report no acute events. On approach patient calm and cooperative, no agitation or aggression noted. Patient states she slept well and appetite has improved. Denies A/V hallucinations at this time. Patient denies suicidal/homicidal ideations. Able to contract for safety. Verbalizes feeling safe on the unit. Anticipated discharge tomorrow 7/2/21. Patient has a good support system, lives with family and is already set up with outpatient treatment. Patient visible on the unit engaging with peers and watching TV in the TV room and attending group. Denies any s/s of COVID. Patient COVID negative.    Spoke to patients father Yao [phone] updated him on patients care and progress and anticipated discharge tomorrow. Father has no issues with discharge. Patient seen and evaluated. As per nursing report no acute events. On approach patient calm and cooperative, no agitation or aggression noted. Patient states she slept well and appetite has improved. Denies A/V hallucinations at this time. Patient denies suicidal/homicidal ideations. Able to contract for safety. Verbalizes feeling safe on the unit. Anticipated discharge tomorrow 7/2/21. Patient has a good support system, lives with family and is already set up with outpatient treatment. Patient visible on the unit engaging with peers and watching TV in the TV room and attending group. Denies any s/s of COVID. Patient COVID negative.    Spoke to patients father Yao [phone] updated him on patients care and progress and anticipated discharge tomorrow. Father has no issues with discharge.    Left message for patients psychiatrist Vickie Garcia [phone] on patients care and progress.

## 2021-07-01 NOTE — PROGRESS NOTE BEHAVIORAL HEALTH - NSBHADMITMEDEDUDETAILS_A_CORE FT
Educated patient on risks and benefits of medication and side effect profile

## 2021-07-01 NOTE — PROGRESS NOTE BEHAVIORAL HEALTH - NSBHCHARTREVIEWINVESTIGATE_PSY_A_CORE FT
< from: 12 Lead ECG (06.21.21 @ 23:39) >      Ventricular Rate 64 BPM    Atrial Rate 64 BPM    P-R Interval 162 ms    QRS Duration 86 ms    Q-T Interval 420 ms    QTC Calculation(Bazett) 433 ms    P Axis 39 degrees    R Axis -28 degrees    T Axis 14 degrees    Diagnosis Line Normal sinus rhythm  Normal ECG  When compared with ECG of 21-FEB-2021 20:50,  No significant change was found

## 2021-07-02 VITALS
HEART RATE: 84 BPM | SYSTOLIC BLOOD PRESSURE: 113 MMHG | TEMPERATURE: 97 F | RESPIRATION RATE: 20 BRPM | DIASTOLIC BLOOD PRESSURE: 81 MMHG

## 2021-07-02 PROCEDURE — 99238 HOSP IP/OBS DSCHRG MGMT 30/<: CPT

## 2021-07-02 RX ADMIN — ARIPIPRAZOLE 5 MILLIGRAM(S): 15 TABLET ORAL at 08:20

## 2021-07-02 NOTE — CHART NOTE - NSCHARTNOTEFT_GEN_A_CORE
D/C today. Patients family will pick patient up. Meds sent to [facility] pharmacy as requested by patient. Patient appeared to be in good spirits today. Endorses a better mood. Insight and judgment have improved. Denies suicidal/ homicidal ideations. Patient able to contract for safety, stating the importance of compliance with medication and treatment. Compliant with medication. Does not warrant continued Inpatient hospitalization. Writer, RN reviewed D/C papers with patient. Patient verbalized understanding. Patient does not appear to be of imminent danger to self or others at this time.    Spoke to psychiatrist Vickie Garcia [phone] updated her on patients care and progress, discharge today. Reviewed medications. Psychiatrist had no issues with discharge.    · Agency Name	[facility]  · Appointment Date/Time	05-Jul-2021 11:30  · Address	[address] [place] [address]   **TELEVIDEO SESSION**  · Phone #	[phone]  · Purpose	Session with NP Vickie Garcia    · Agency Name	[facility]  · Appointment Date/Time	06-Jul-2021 07:30  · Address	[address] [place] [address]  **TELEVIDEO SESSION**  · Phone #	[phone]  · Purpose	Session with Dominic Perez
Social Work Admit Note:    Patient is a 26 years of age female who was admitted for evaluation of psychotic symptoms.  At the time of assessment in the emergency department, patient informed that for the last few weeks she has been hearing a voice telling her to kill herself by jumping in front of a train.  She also reported that she has been seeing faces change and has been feeling that Malag[place] spies or aliens are spying on her.  Patient informed that in the past 2 weeks she has tried to kill herself 3 times; once by going to the train station (stopped by mom), once by taking 7 pills of ibuprofen and once by cutting her wrist with a razor.  Patient was admitted to [facility] for safety and stabilization.      In the community, patient is domiciled in a private residence with her family.  She is single marital status and does not have children.  She is unemployed.  Patient has a past psychiatric history of schizophrenia with one prior inpatient admission at [facility] in February 2021.   Patient informs she is currently engaged in outpatient treatment at [facility] and has been intermittently non compliant with medications.      Sexual History – Patient identifies as heterosexual   Family relationships and history – Patients parents are her primary social supports  Leisure Activity Assessment – Spending time with friends  Community Supports – No known attendance in any self- help groups or other organizations.  Employment – Patient is unemployed.   Substance Use Assessment – Patient denies   History of suicidality or self- injurious behaviors – Yes  Significant Loses – None known  Life Goals – Patient verbalized goal of improved mental health      will continue to meet with patient 1:1 and with treatment team daily.  Discharge plan is for continued mental health treatment in outpatient setting.       Please refer to Social Work Psychosocial for additional information.
Social Work Note    Leigha remains on the unit for continued treatment, safety, and observation.  Patient attended treatment team meeting today.  She was calm, cooperative, and pleasant.  She reports that she no longer hears any voices.  She denies SI/HI.  Patient has been medication compliant and in good behavioral control.  She has been mostly isolative to her room.  Treatment team encouraged patient to attend a unit group today; patient verbalized agreement.      Once stable for discharge, patient will return to her home in [facility] where she lives with her family.  She will resume services with her psychiatrist Vickie Garcia [phone].    At this time patient is not psychiatrically stable for discharge.     Mental Status Exam:    Mood –Euthymic  Sleep – Fair  Appetite - Good  ADLs – Good  Thought Process – Linear  Observation – Routine q10.
Social Work Note:    Treatment team met with patient to discuss treatment plan, medications and discharge plan.  During the day patient is observed to be isolative to her room.  Patient was educated to the benefits of attending and actively participating in groups that are offered on the unit. Patient states she goes to the groups in the afternoons. Patient indicates she is feeling better and that she is experiencing less auditory hallucinations. Patient contracts for safety on unit and denies SI HI at this time. Patient is compliant with medications and unit protocol. Patient is in good behavioral control. Patient considered for discharge end of week providing no regressive behaviors and or medical complications.     This worker met with patient to discuss discharge plan.  Patient states she will go home with her family. Patient states she will resume treatment with Psychiatrist Vickie Garcia [phone]. Patient is aware and agreeable to same.      Mental Status Exam:    Mood – Neutral  Sleep - Good  Appetite - Good  ADLs - WNL  Thought Process – Linear    Observation – [id number]    No barriers to discharge identified at this time. Plan is for referral to patient’s private community psychiatrist.
Social Work Note:    Treatment team met with patient to discuss treatment plan, medications and discharge plan.  During the day patient is observed to be isolative to her room.  Patient was educated to the benefits of attending and actively participating in groups that are offered on the unit. Patient states she goes to the groups in the afternoons. Patient indicates she is feeling better and that she is experiencing less auditory hallucinations. Patient contracts for safety on unit and denies SI HI at this time. Patient is compliant with medications and unit protocol. Patient is in good behavioral control. Patient considered for discharge end of week providing no regressive behaviors and or medical complications.     This worker met with patient to discuss discharge plan.  Patient states she will go home with her family. Patient states she will resume treatment with Psychiatrist Vickie Garcia [phone]. Patient is aware and agreeable to same.      Mental Status Exam:    Mood – Neutral  Sleep - Good  Appetite - Good  ADLs - WNL  Thought Process – Linear    Observation – [id number]    No barriers to discharge identified at this time. Plan is for referral to patient’s private community psychiatrist.

## 2021-07-13 DIAGNOSIS — R45.851 SUICIDAL IDEATIONS: ICD-10-CM

## 2021-07-13 DIAGNOSIS — R45.1 RESTLESSNESS AND AGITATION: ICD-10-CM

## 2021-07-13 DIAGNOSIS — Z91.14 PATIENT'S OTHER NONCOMPLIANCE WITH MEDICATION REGIMEN: ICD-10-CM

## 2021-07-13 DIAGNOSIS — F20.9 SCHIZOPHRENIA, UNSPECIFIED: ICD-10-CM

## 2021-07-13 DIAGNOSIS — E87.6 HYPOKALEMIA: ICD-10-CM

## 2021-07-13 DIAGNOSIS — Z56.0 UNEMPLOYMENT, UNSPECIFIED: ICD-10-CM

## 2021-07-13 DIAGNOSIS — Z91.5 PERSONAL HISTORY OF SELF-HARM: ICD-10-CM

## 2021-07-13 DIAGNOSIS — G47.00 INSOMNIA, UNSPECIFIED: ICD-10-CM

## 2021-07-13 SDOH — ECONOMIC STABILITY - INCOME SECURITY: UNEMPLOYMENT, UNSPECIFIED: Z56.0

## 2022-03-08 NOTE — BH SAFETY PLAN - INTERNAL COPING STRATEGY 2
WEIGHT MANAGEMENT HOME INSTRUCTIONS     Meal Planning   Eat regularly during the day, every 4-5 hours, Do not skip meals.  Include consistent portions and meal times.  Include healthy snacks as needed.  Use plate method to plan meals and control portions.  Include a good source of protein at all meals and snacks.  Keep food records using phone ambrose or written records, try MyFitnessPal or LoseIt. Track for a minimum of 2 days weekly, one work day and one non-work day OR one weekday and one weekend day.    Physical Activity   Increase walking by parking further from store entrance, taking the stairs rather than the elevator and increasing your steps throughout the day.     I will work up to performing 300 minutes of exercise weekly (60 minutes, 5 times per week OR 45 minutes 6 times per week).     Goal Planning:   The meal plan will also require behavior change.   To accomplish this the goal you selected is: I will drink sugar free beverages instead of sugary beverages for at least 3-4 days weekly.    TIPS FOR WEIGHT CONTROL    1.  EXERCISE MORE!  At least 3x/week for a minimum of half an hour each time.  This could include walking, biking, jogging, aerobics, swimming, basketball, or any other \"aerobic\" activity.  This not only helps to burn-off calories, but it also helps you look and feel better!    2.  EAT 3 MEALS/DAY - Do Not Skip Meals! Skipping meals may lead to excess hunger at the next meal.  It also leads to bingeing or \"pigging-out\"!    3.  PACK A LUNCH instead of going out for lunch or eating in the cafeteria.  Eating out often means eating foods higher in fat.  Packing a lunch will help you stay in control.    4.  SNACKS - Avoid chips, cakes, cookies, ice cream, and candy.  Try fresh fruit, raw vegetables, or plain popcorn.  Drink diet soda, diet Tyrese-Aid or WATER.  Use 1% or skim milk.  Limit use of fruit juice as a beverage.    5.  KEEP A FOOD DIARY.  Record the type and amount of food you eat.    6.   STAY AWAY FROM FRIED FOODS such as french fries, fried chicken, potato chips, etc.    7.  WHEN GOING OUT FOR FAST FOOD, have a plain hamburger or salad with low-calorie dressing.  Milkshakes are high in calories.  Instead, have a diet soda or a carton of low-fat milk.    8.  PLAN TO LOSE WEIGHT SLOWLY! One to two pounds per week is considered average.  Weigh yourself only once each week.  Take your measurements at the beginning of your diet.  Sometimes you will lose inches without losing weight.    9.  IF POSSIBLE, WALK, JOG, OR RIDE YOUR BIKE to school, work, or the store instead of taking the car or bus.  Take the stairs instead of the elevator. Any extra activity helps.    10. PLAN MEALS THE DAY BEFORE so you know what you'll be eating.  If you are going to a party at night, cut back a little at breakfast and lunch.     Another way I would cope with my depression would be eating healthy

## 2023-01-06 NOTE — PROGRESS NOTE BEHAVIORAL HEALTH - NSBHADMITDANGERSELF_PSY_A_CORE
suicidal ideation with plan and means
suicidal behavior/unable to care for self
suicidal ideation with plan and means
Yes
suicidal ideation with plan and means

## 2024-04-27 NOTE — ED ADULT NURSE NOTE - CHIEF COMPLAINT
Occupational Therapy    Patient not seen in therapy.     Patient with other health care provider ([facility])    Re-attempt plan: per established plan of care      OBJECTIVE                          Therapy procedure time and total treatment time can be found documented on the Time Entry flowsheet   The patient is a 25y Female complaining of psychiatric evaluation.

## 2025-08-22 ENCOUNTER — OFFICE VISIT (OUTPATIENT)
Dept: URGENT CARE | Age: 30
End: 2025-08-22
Payer: COMMERCIAL

## 2025-08-22 VITALS
WEIGHT: 115 LBS | DIASTOLIC BLOOD PRESSURE: 81 MMHG | OXYGEN SATURATION: 99 % | RESPIRATION RATE: 18 BRPM | BODY MASS INDEX: 18.05 KG/M2 | HEART RATE: 69 BPM | HEIGHT: 67 IN | SYSTOLIC BLOOD PRESSURE: 115 MMHG

## 2025-08-22 DIAGNOSIS — Z20.2 POSSIBLE EXPOSURE TO STI: ICD-10-CM

## 2025-08-22 DIAGNOSIS — N76.0 BV (BACTERIAL VAGINOSIS): Primary | ICD-10-CM

## 2025-08-22 DIAGNOSIS — B96.89 BV (BACTERIAL VAGINOSIS): Primary | ICD-10-CM

## 2025-08-22 LAB
POC APPEARANCE, URINE: CLEAR
POC BILIRUBIN, URINE: NEGATIVE
POC BLOOD, URINE: NEGATIVE
POC COLOR, URINE: YELLOW
POC GLUCOSE, URINE: NEGATIVE MG/DL
POC KETONES, URINE: NEGATIVE MG/DL
POC LEUKOCYTES, URINE: NEGATIVE
POC NITRITE,URINE: NEGATIVE
POC PH, URINE: 6 PH
POC PROTEIN, URINE: NEGATIVE MG/DL
POC SPECIFIC GRAVITY, URINE: 1.01
POC UROBILINOGEN, URINE: 0.2 EU/DL
PREGNANCY TEST URINE, POC: NEGATIVE

## 2025-08-23 LAB
BV SCORE VAG QL: ABNORMAL
C TRACH RRNA SPEC QL NAA+PROBE: NORMAL
T VAGINALIS RRNA SPEC QL NAA+PROBE: NORMAL

## 2025-08-23 RX ORDER — METRONIDAZOLE 500 MG/1
500 TABLET ORAL 2 TIMES DAILY
Qty: 14 TABLET | Refills: 0 | Status: SHIPPED | OUTPATIENT
Start: 2025-08-23 | End: 2025-08-30